# Patient Record
Sex: FEMALE | Race: WHITE | NOT HISPANIC OR LATINO | Employment: OTHER | URBAN - METROPOLITAN AREA
[De-identification: names, ages, dates, MRNs, and addresses within clinical notes are randomized per-mention and may not be internally consistent; named-entity substitution may affect disease eponyms.]

---

## 2017-01-22 DIAGNOSIS — E78.5 HYPERLIPIDEMIA: ICD-10-CM

## 2017-01-23 RX ORDER — SIMVASTATIN 20 MG
TABLET ORAL
Qty: 90 TABLET | Refills: 0 | Status: SHIPPED | OUTPATIENT
Start: 2017-01-23 | End: 2018-05-25 | Stop reason: SDUPTHER

## 2017-06-02 ENCOUNTER — OFFICE VISIT (OUTPATIENT)
Dept: OBSTETRICS AND GYNECOLOGY | Age: 82
End: 2017-06-02

## 2017-06-02 VITALS — DIASTOLIC BLOOD PRESSURE: 70 MMHG | SYSTOLIC BLOOD PRESSURE: 140 MMHG | BODY MASS INDEX: 19.6 KG/M2 | WEIGHT: 93.8 LBS

## 2017-06-02 DIAGNOSIS — N64.4 MASTALGIA IN FEMALE: ICD-10-CM

## 2017-06-02 DIAGNOSIS — Z01.419 WELL WOMAN EXAM WITH ROUTINE GYNECOLOGICAL EXAM: Primary | ICD-10-CM

## 2017-06-02 LAB
BILIRUB BLD-MCNC: NEGATIVE MG/DL
GLUCOSE UR STRIP-MCNC: NEGATIVE MG/DL
KETONES UR QL: NEGATIVE
LEUKOCYTE EST, POC: NEGATIVE
NITRITE UR-MCNC: NEGATIVE MG/ML
PH UR: 6.5 [PH] (ref 5–8)
PROT UR STRIP-MCNC: NEGATIVE MG/DL
RBC # UR STRIP: NEGATIVE /UL
SP GR UR: 1.02 (ref 1–1.03)
UROBILINOGEN UR QL: NORMAL

## 2017-06-02 PROCEDURE — 99397 PER PM REEVAL EST PAT 65+ YR: CPT | Performed by: OBSTETRICS & GYNECOLOGY

## 2017-06-02 PROCEDURE — 81003 URINALYSIS AUTO W/O SCOPE: CPT | Performed by: OBSTETRICS & GYNECOLOGY

## 2017-06-02 NOTE — PROGRESS NOTES
ANNUAL GYN EXAM        6/3/2017    Subjective     Karla Lambert is a 84 y.o., [place] White Female.    Chief Complaint   Patient presents with   • Gynecologic Exam     NEW PATIENT - FORMER PATIENT        History of Present Illness:     Gyn Complaints:  =Right Breast Sensitivity: Her right breast is been sensitive recently, for about the last 3 months, although she is a little vague about it.  It is just uncomfortable to wear her bra.  It is tender for anything to touch the breast.  She does regular self breast exams and had her mammogram today.  She has no nipple discharge.  She cannot remember any recent injury to the breast.                                  =Lower Abdominal Swelling.  She has noticed some swelling in her lower abdomen for about the last 3 months.  It is not painful.  Her appetite is okay and she states her weight is stable although she is very thin, to the point of being cachectic.  She does need a little help with her bowel movements, stool softeners and some Dulcolax.  Nevertheless, she has bowel movements daily most of the time.  She describes no blood, no mucus with her bowel movements.  There has been no change in their appearance.        She is voiding without any difficulty but she does have some general incontinence.  She has no dysuria.  She wears a pad all the time in addition to tissue paper on top of the pad.  She changes the tissue paper about 3-4 times a day.  She doesn't really notice stress incontinence but does have some urgency incontinence.                                     During her examination she pointed to the area of swelling as being the mons pubis !!  I think she is just feeling her pubic bones which are rather prominent when she is flat on her back because of her low weight.    1.Menstrual Hx:  Post Menopausal.   Her  Luis Eduardo passed in 1988, She was   12 years 2 months and 14 days     2.Pap Smear Hx: She has never had an abnormal Pap smear.  Mother  had cervical cancer dx @ 80 and  in   at age 81.    3.Breast / Ovarian Hx:   Regular SBE.       Family history of breast cancer:  Great Niece     Family history of ovarian cancer: None    4.Family Hx of Colon Ca: None       Family Hx of Uterine Ca: Mother, had cervical cancer at the age of 80,  at 81 years of age in .    5. New Past Medical History: Valve Replacement, Breast Biopsy   Past Medical History:   Diagnosis Date   • Anxiety 2016   • Aortic valve stenosis 2013   • Benign essential hypertension 2013   • Bicuspid aortic valve 2013   • Chronic obstructive pulmonary disease 2016   • Hyperlipidemia 2016   • Lung cancer         6. New Past Surgical History:   Past Surgical History:   Procedure Laterality Date   • AORTIC VALVE REPAIR/REPLACEMENT      History of bicuspid valve .  DR. RAMIRES, valve replacement with PIG valve.    • COLONOSCOPY      x1  does not remember date    • FOOT SURGERY     • LUNG BIOPSY      Lung biopsy by Dr. Church= lung cancer.  Treated with irradiation, no chemotherapy.        7. New Family Medical History:   Family History   Problem Relation Age of Onset   • Cervical cancer Mother 80      in , at age 81.   • No Known Problems Father    • No Known Problems Sister    • No Known Problems Brother    • No Known Problems Daughter    • No Known Problems Son    • No Known Problems Maternal Grandmother    • No Known Problems Paternal Grandmother    • No Known Problems Maternal Aunt    • No Known Problems Paternal Aunt    • Breast cancer Other    • BRCA 1/2 Neg Hx    • Colon cancer Neg Hx    • Endometrial cancer Neg Hx    • Ovarian cancer Neg Hx          8.   OB History    Para Term  AB SAB TAB Ectopic Multiple Living   0 0 0 0 0 0 0 0 0 0              9.   Health Maintenance   Topic Date Due   • INFLUENZA VACCINE  2017   • PNEUMOCOCCAL VACCINES (65+ LOW/MEDIUM RISK) (2 of 2 - PPSV23) 2017   • LIPID PANEL   08/19/2017   • TDAP/TD VACCINES (2 - Td) 09/13/2026   • ZOSTER VACCINE  Completed       The following portions of the patient's history were reviewed / updated as appropriate: allergies, current medications, past medical history, past surgical history, past family history, past social history, and problem list.    Review of Systems   HENT:        Mouth sores.   Eyes: Negative.    Respiratory: Negative.    Cardiovascular: Negative.    Gastrointestinal: Positive for constipation.   Endocrine: Negative.    Genitourinary: Negative.    Skin:        Hair loss  Right breast tenderness.   Allergic/Immunologic:        Seasonal allergies, itching.   Hematological: Bruises/bleeds easily.       Objective     /70  Wt 93 lb 12.8 oz (42.5 kg)  BMI 19.6 kg/m2    PHYSICAL EXAM    Constitutional: Well-developed, cachectic, frail appearing white female, in no distress, alert and well oriented ×3.    Skin is warm, dry, without rash.  She has multiple seborrheic keratoses especially on her trunk.       Neck appears normal, trachea in the midline.  Thyroid exam normal.  No carotid bruits.         No cervical lymphadenopathy.    Lungs clear to auscultation.  No bruits are heard down the left posterior chest.  She has marked scoliosis and moderate kyphosis.  The spinal rotation is such that she has marked prominence of her left posterior ribs.    Heart with regular rhythm with a faint 1/6 systolic ejection murmur, best heard in the right upper sternal border.    Breasts: The right breast exam is normal.        Right breast nontender, without dominant mass.  No nipple discharge.            No superficial skin changes.  No axillary adenopathy.        Left breast nontender, without dominant mass.  No nipple discharge.            No superficial skin changes.  No axillary adenopathy.      Abdomen is flat, soft and nontender.No palpable mass.           No hepatosplenomegaly.  Flanks are negative.          Bowel sounds normal.           "She has a faint bruit down the mid abdomen that extends along both common iliac arteries.  It is significantly louder over the right common iliac/femoral artery.         I do not feel an aneurysm along the aorta.         No inguinal lymphadenopathy bilaterally.          When asked to point out the area of swelling, she points to the pubic symphysis/mons pubis, which is not abnormally enlarged nor tender.  It feels normal.           But it is prominent because her abdomen is scaphoid and she is markedly thin.  I reassured her that this is a normal finding, and she seems reassured.    Pelvic exam :  Vulva normal.           External genitalia normal.  BUS negative.             Introitus atrophic and narrow.  Vaginal mucosa atrophic but normal.            Cervix normal, Pap smear taken because of patient's complaint of abdominal swelling.          I can only do a single finger exam, no cervical motion tenderness.          Uterus midplane and very small, normal shape, and position.          Adnexa are negative bilaterally.  No tenderness.  No palpable mass.          I am unable to perform a rectovaginal exam because of anal stenosis, it is a very tight anus.    Musc /Skel: Lower extremities without edema.  But she has multiple superficial varicosities and varicose veins.    Neuro: Coordination is grossly normal.  Gait is normal.  She never Gibbons by herself well but is obviously frail.    Psych: Mood and affect is normal.  Behavior normal.  Thought content normal.            Judgment normal.        Assessment/Plan   Karla was seen today for gynecologic exam.    Diagnoses and all orders for this visit:    Well woman exam with routine gynecological exam  -     POC Urinalysis Dipstick, Automated    Mastalgia in female  Comments:  Right breast.  Normal physical exam.  Mammogram today, await results.  Reassured patient about normal physical exam.      COMMENTS: I reassured Karla about her \"lower abdominal swelling\" which is " just her prominent mons pubis and pubic symphysis because of her marked weight loss.    Moiz Padilla MD

## 2017-06-03 PROBLEM — N64.4 MASTALGIA IN FEMALE: Status: ACTIVE | Noted: 2017-06-03

## 2017-10-27 ENCOUNTER — TELEPHONE (OUTPATIENT)
Dept: INTERNAL MEDICINE | Age: 82
End: 2017-10-27

## 2017-10-27 DIAGNOSIS — E78.5 HYPERLIPIDEMIA: ICD-10-CM

## 2017-10-27 RX ORDER — SIMVASTATIN 20 MG
TABLET ORAL
Qty: 90 TABLET | Refills: 1 | Status: SHIPPED | OUTPATIENT
Start: 2017-10-27 | End: 2018-04-18 | Stop reason: SDUPTHER

## 2017-10-27 NOTE — TELEPHONE ENCOUNTER
Called and said that she was out of her Simvastatin 20 MG, 12 90 tabs 1 time a day, her next apt is scheduled for May 25, 2018.  She said that she would like this to go to [facility].    [phone]

## 2017-11-01 ENCOUNTER — FLU SHOT (OUTPATIENT)
Dept: INTERNAL MEDICINE | Age: 82
End: 2017-11-01

## 2017-11-01 PROCEDURE — 90662 IIV NO PRSV INCREASED AG IM: CPT | Performed by: INTERNAL MEDICINE

## 2017-11-01 PROCEDURE — G0008 ADMIN INFLUENZA VIRUS VAC: HCPCS | Performed by: INTERNAL MEDICINE

## 2017-12-27 ENCOUNTER — HOSPITAL ENCOUNTER (OUTPATIENT)
Dept: CT IMAGING | Facility: HOSPITAL | Age: 82
Discharge: HOME | End: 2017-12-27
Attending: THORACIC SURGERY (CARDIOTHORACIC VASCULAR SURGERY) | Admitting: THORACIC SURGERY (CARDIOTHORACIC VASCULAR SURGERY)

## 2017-12-27 DIAGNOSIS — C34.10 MALIGNANT NEOPLASM OF BRONCHUS OF UPPER LOBE, UNSPECIFIED LATERALITY (HCC): ICD-10-CM

## 2017-12-27 PROCEDURE — 71250 CT THORAX DX C-: CPT

## 2018-01-09 ENCOUNTER — OFFICE VISIT (OUTPATIENT)
Dept: SURGERY | Facility: CLINIC | Age: 83
End: 2018-01-09

## 2018-01-09 VITALS
SYSTOLIC BLOOD PRESSURE: 117 MMHG | OXYGEN SATURATION: 95 % | WEIGHT: 94 LBS | HEIGHT: 58 IN | DIASTOLIC BLOOD PRESSURE: 61 MMHG | BODY MASS INDEX: 19.73 KG/M2 | HEART RATE: 69 BPM

## 2018-01-09 DIAGNOSIS — C34.12 MALIGNANT NEOPLASM OF BRONCHUS OF LEFT UPPER LOBE (HCC): ICD-10-CM

## 2018-01-09 DIAGNOSIS — R91.1 LUNG NODULE: Primary | ICD-10-CM

## 2018-01-09 PROCEDURE — 99213 OFFICE O/P EST LOW 20 MIN: CPT | Performed by: THORACIC SURGERY (CARDIOTHORACIC VASCULAR SURGERY)

## 2018-01-09 NOTE — PROGRESS NOTES
Subjective   Patient ID: Karla Lambert is a 85 y.o. female is here today for follow-up.    History of Present Illness  Dear Colleague,  Karla Lambert was seen in our office today for other follow-up of a stage I adenocarcinoma of the lung treated with stereotactic radiation therapy in March 2015.  Since her last visit here she has done quite well. She reports no cough and no hemoptysis.  She has no hoarseness or change in her voice.  She has had no pleuritic pain.  She is relatively active with no significant shortness of breath or wheezing.  She has had no unexplained weight loss.    The following portions of the patient's history were reviewed and updated as appropriate: allergies, current medications, past family history, past medical history, past social history, past surgical history and problem list.  Review of Systems   Constitution: Negative.   HENT: Negative.    Eyes: Negative.    Cardiovascular: Negative.    Respiratory: Negative.    Endocrine: Negative.    Hematologic/Lymphatic: Negative.    Skin: Negative.    Musculoskeletal: Negative.    Gastrointestinal: Negative.    Genitourinary: Negative.    Neurological: Negative.    Psychiatric/Behavioral: Negative.      Patient Active Problem List   Diagnosis   • Anxiety   • Chronic obstructive pulmonary disease   • Hyperlipidemia   • Malignant neoplasm of bronchus of upper lobe   • Abnormal loss of weight   • Skin tear of elbow without complication   • Bicuspid aortic valve   • History of aortic valve replacement   • Routine health maintenance   • Weight loss   • Mastalgia in female   • Lung nodule     Past Medical History:   Diagnosis Date   • Anxiety 6/14/2016   • Aortic valve stenosis 8/9/2013   • Benign essential hypertension 8/9/2013   • Bicuspid aortic valve 8/9/2013   • Chronic obstructive pulmonary disease 6/14/2016   • Hyperlipidemia 6/14/2016   • Lung cancer 2005     Past Surgical History:   Procedure Laterality Date   • AORTIC VALVE  REPAIR/REPLACEMENT      History of bicuspid valve .  DR. RAMIRES, valve replacement with PIG valve.    • COLONOSCOPY      x1  does not remember date    • FOOT SURGERY     • LUNG BIOPSY      Lung biopsy by Dr. Church= lung cancer.  Treated with irradiation, no chemotherapy.     Family History   Problem Relation Age of Onset   • Cervical cancer Mother 80      in , at age 81.   • No Known Problems Father    • No Known Problems Sister    • No Known Problems Brother    • No Known Problems Daughter    • No Known Problems Son    • No Known Problems Maternal Grandmother    • No Known Problems Paternal Grandmother    • No Known Problems Maternal Aunt    • No Known Problems Paternal Aunt    • Breast cancer Other    • BRCA 1/2 Neg Hx    • Colon cancer Neg Hx    • Endometrial cancer Neg Hx    • Ovarian cancer Neg Hx      Social History     Social History   • Marital status:      Spouse name: GISELE   • Number of children: 0   • Years of education: N/A     Occupational History   • Not on file.     Social History Main Topics   • Smoking status: Former Smoker     Types: Cigarettes   • Smokeless tobacco: Never Used      Comment: Now has COPD and lung cancer.   • Alcohol use No   • Drug use: No   • Sexual activity: Defer      Comment:       Other Topics Concern   • Not on file     Social History Narrative       Current Outpatient Prescriptions:   •  aspirin (ASPIRIN LOW DOSE) 81 MG tablet, Take  by mouth daily., Disp: , Rfl:   •  Calcium Carbonate-Vitamin D (CALCIUM 500 + D) 500-125 MG-UNIT tablet, Take  by mouth daily., Disp: , Rfl:   •  cyanocobalamin 100 MCG tablet, Take  by mouth daily., Disp: , Rfl:   •  Multiple Vitamins-Minerals ([employer] SPECTRAVITE ULTRA WOMEN) tablet, Take 1 capsule by mouth., Disp: , Rfl:   •  Omega-3 Fatty Acids (FISH OIL) 1200 MG capsule capsule, Take  by mouth., Disp: , Rfl:   •  polyethyl glycol-propyl glycol (SYSTANE) 0.4-0.3 % solution ophthalmic solution, Apply  to eye.,  Disp: , Rfl:   •  Potassium Gluconate 550 MG tablet, Take 1 tablet by mouth daily., Disp: , Rfl:   •  pyridoxine (VITAMIN B-6) 100 MG tablet, Take  by mouth., Disp: , Rfl:   •  simvastatin (ZOCOR) 20 MG tablet, TAKE 1 TABLET BY MOUTH EVERY DAY EVERY NIGHT, Disp: 90 tablet, Rfl: 0  •  simvastatin (ZOCOR) 20 MG tablet, TAKE 1 TABLET BY MOUTH EVERY NIGHT., Disp: 90 tablet, Rfl: 1  Allergies   Allergen Reactions   • Morphine Sulfate    • Penicillins         Objective   Vitals:    01/09/18 0852   BP: 117/61   Pulse: 69   SpO2: 95%     Physical Exam   Constitutional: She is oriented to person, place, and time. She appears well-developed and well-nourished.   HENT:   Head: Normocephalic.   Eyes: Conjunctivae, EOM and lids are normal. Pupils are equal, round, and reactive to light.   Neck: Trachea normal and normal range of motion. Neck supple. No hepatojugular reflux and no JVD present. Carotid bruit is not present. No thyroid mass and no thyromegaly present.   Cardiovascular: Normal rate, regular rhythm, S1 normal, S2 normal and normal pulses.   No extrasystoles are present. PMI is not displaced.    Murmur heard.   Systolic murmur is present with a grade of 2/6   Pulmonary/Chest: Effort normal and breath sounds normal.   Abdominal: Soft. Normal appearance and bowel sounds are normal. She exhibits no mass. There is no hepatosplenomegaly. There is no tenderness. No hernia.   Musculoskeletal: Normal range of motion.   Neurological: She is alert and oriented to person, place, and time. She has normal strength and normal reflexes. No cranial nerve deficit or sensory deficit. She displays a negative Romberg sign.   Skin: Skin is warm, dry and intact.   Psychiatric: She has a normal mood and affect. Her speech is normal and behavior is normal. Judgment and thought content normal. Cognition and memory are normal.     Independent Review of Radiographic Studies:    CT scan of the chest performed December 27, 2017 was independently  reviewed and compared to previous x-rays. There is an increasing soft tissue density medial to the site of the previously treated neoplasm with stereotactic radiation. This nodule measures 11 x 9 mm. There are no other infiltrates nodules or masses.  There is no pathologically enlarged hilar or mediastinal adenopathy.  Her abdomen is unremarkable.          Assessment/Plan       This new nodule is suspicious for a recurrent lung cancer.  I have ordered a CT PET scan to evaluate this further.  Should this be PET positive she will then need a needle biopsy.  I've discussed this with the patient and her family in detail.  They understand my plan and agree.  Once the PET scan is completed she will return here for further evaluation.  I will keep you informed of her progress.    Diagnoses and all orders for this visit:    Lung nodule    Malignant neoplasm of bronchus of left upper lobe

## 2018-01-11 DIAGNOSIS — Z85.118 HISTORY OF LUNG CANCER: ICD-10-CM

## 2018-01-11 DIAGNOSIS — Z85.118 HISTORY OF CANCER OF UPPER LOBE BRONCHUS OR LUNG: ICD-10-CM

## 2018-01-11 DIAGNOSIS — R91.8 LUNG MASS: Primary | ICD-10-CM

## 2018-01-11 DIAGNOSIS — R91.1 LUNG NODULE: Primary | ICD-10-CM

## 2018-01-12 ENCOUNTER — HOSPITAL ENCOUNTER (OUTPATIENT)
Dept: PET IMAGING | Facility: HOSPITAL | Age: 83
Discharge: HOME | End: 2018-01-12

## 2018-01-12 ENCOUNTER — HOSPITAL ENCOUNTER (OUTPATIENT)
Dept: PET IMAGING | Facility: HOSPITAL | Age: 83
Discharge: HOME | End: 2018-01-12
Attending: THORACIC SURGERY (CARDIOTHORACIC VASCULAR SURGERY)

## 2018-01-12 ENCOUNTER — HOSPITAL ENCOUNTER (OUTPATIENT)
Dept: PET IMAGING | Facility: HOSPITAL | Age: 83
Discharge: HOME | End: 2018-01-12
Attending: THORACIC SURGERY (CARDIOTHORACIC VASCULAR SURGERY) | Admitting: THORACIC SURGERY (CARDIOTHORACIC VASCULAR SURGERY)

## 2018-01-12 DIAGNOSIS — Z85.118 HISTORY OF LUNG CANCER: ICD-10-CM

## 2018-01-12 DIAGNOSIS — R91.1 LUNG NODULE: ICD-10-CM

## 2018-01-12 DIAGNOSIS — R91.8 LUNG MASS: ICD-10-CM

## 2018-01-12 DIAGNOSIS — Z85.118 HISTORY OF CANCER OF UPPER LOBE BRONCHUS OR LUNG: ICD-10-CM

## 2018-01-12 LAB — GLUCOSE BLDC GLUCOMTR-MCNC: 90 MG/DL (ref 70–130)

## 2018-01-12 PROCEDURE — 78815 PET IMAGE W/CT SKULL-THIGH: CPT

## 2018-01-12 PROCEDURE — 0 FLUDEOXYGLUCOSE F18 SOLUTION: Performed by: THORACIC SURGERY (CARDIOTHORACIC VASCULAR SURGERY)

## 2018-01-12 PROCEDURE — A9552 F18 FDG: HCPCS | Performed by: THORACIC SURGERY (CARDIOTHORACIC VASCULAR SURGERY)

## 2018-01-12 PROCEDURE — 82962 GLUCOSE BLOOD TEST: CPT

## 2018-01-12 RX ADMIN — FLUDEOXYGLUCOSE F18 1 DOSE: 300 INJECTION INTRAVENOUS at 10:00

## 2018-01-15 ENCOUNTER — TELEPHONE (OUTPATIENT)
Dept: SURGERY | Facility: CLINIC | Age: 83
End: 2018-01-15

## 2018-01-15 DIAGNOSIS — C34.11 MALIGNANT NEOPLASM OF UPPER LOBE OF RIGHT LUNG (HCC): Primary | ICD-10-CM

## 2018-01-15 NOTE — TELEPHONE ENCOUNTER
I called Mrs. Lambert today and discussed the results of the CT PET scan.  The area in the apex of the lung  looks like radiation changes, no PET evidence of cancer.  I have recommended a repeat CT of the chest in 4 months.  She'll return to see me in the office with that CT scan.

## 2018-04-18 DIAGNOSIS — E78.5 HYPERLIPIDEMIA: ICD-10-CM

## 2018-04-18 RX ORDER — SIMVASTATIN 20 MG
TABLET ORAL
Qty: 90 TABLET | Refills: 1 | Status: SHIPPED | OUTPATIENT
Start: 2018-04-18 | End: 2018-11-12

## 2018-05-15 ENCOUNTER — APPOINTMENT (OUTPATIENT)
Dept: CT IMAGING | Facility: HOSPITAL | Age: 83
End: 2018-05-15
Attending: THORACIC SURGERY (CARDIOTHORACIC VASCULAR SURGERY)

## 2018-05-25 ENCOUNTER — HOSPITAL ENCOUNTER (OUTPATIENT)
Dept: CT IMAGING | Facility: HOSPITAL | Age: 83
Discharge: HOME | End: 2018-05-25

## 2018-05-25 ENCOUNTER — HOSPITAL ENCOUNTER (OUTPATIENT)
Dept: CT IMAGING | Facility: HOSPITAL | Age: 83
Discharge: HOME | End: 2018-05-25
Attending: THORACIC SURGERY (CARDIOTHORACIC VASCULAR SURGERY) | Admitting: THORACIC SURGERY (CARDIOTHORACIC VASCULAR SURGERY)

## 2018-05-25 ENCOUNTER — OFFICE VISIT (OUTPATIENT)
Dept: INTERNAL MEDICINE | Age: 83
End: 2018-05-25

## 2018-05-25 VITALS
TEMPERATURE: 98.1 F | HEART RATE: 74 BPM | OXYGEN SATURATION: 98 % | SYSTOLIC BLOOD PRESSURE: 136 MMHG | HEIGHT: 57 IN | DIASTOLIC BLOOD PRESSURE: 64 MMHG | WEIGHT: 92.2 LBS | BODY MASS INDEX: 19.89 KG/M2

## 2018-05-25 DIAGNOSIS — Z00.00 MEDICARE ANNUAL WELLNESS VISIT, SUBSEQUENT: Primary | ICD-10-CM

## 2018-05-25 DIAGNOSIS — R41.3 MEMORY CHANGE: ICD-10-CM

## 2018-05-25 DIAGNOSIS — R19.8 ABDOMINAL FULLNESS IN RIGHT UPPER QUADRANT: ICD-10-CM

## 2018-05-25 DIAGNOSIS — E78.5 HYPERLIPIDEMIA, UNSPECIFIED HYPERLIPIDEMIA TYPE: ICD-10-CM

## 2018-05-25 DIAGNOSIS — C34.11 MALIGNANT NEOPLASM OF UPPER LOBE OF RIGHT LUNG (HCC): ICD-10-CM

## 2018-05-25 DIAGNOSIS — Z23 ENCOUNTER FOR IMMUNIZATION: ICD-10-CM

## 2018-05-25 DIAGNOSIS — F17.200 CURRENT SMOKER: ICD-10-CM

## 2018-05-25 LAB — CREAT BLDA-MCNC: 0.8 MG/DL (ref 0.6–1.3)

## 2018-05-25 PROCEDURE — G0009 ADMIN PNEUMOCOCCAL VACCINE: HCPCS | Performed by: INTERNAL MEDICINE

## 2018-05-25 PROCEDURE — 71250 CT THORAX DX C-: CPT

## 2018-05-25 PROCEDURE — 99214 OFFICE O/P EST MOD 30 MIN: CPT | Performed by: INTERNAL MEDICINE

## 2018-05-25 PROCEDURE — G0439 PPPS, SUBSEQ VISIT: HCPCS | Performed by: INTERNAL MEDICINE

## 2018-05-25 PROCEDURE — 90732 PPSV23 VACC 2 YRS+ SUBQ/IM: CPT | Performed by: INTERNAL MEDICINE

## 2018-05-25 PROCEDURE — 25010000002 IOPAMIDOL 61 % SOLUTION: Performed by: INTERNAL MEDICINE

## 2018-05-25 PROCEDURE — 82565 ASSAY OF CREATININE: CPT

## 2018-05-25 PROCEDURE — 74160 CT ABDOMEN W/CONTRAST: CPT

## 2018-05-25 RX ADMIN — IOPAMIDOL 85 ML: 612 INJECTION, SOLUTION INTRAVENOUS at 12:52

## 2018-05-25 NOTE — PATIENT INSTRUCTIONS
Medicare Wellness  Personal Prevention Plan of Service     Date of Office Visit:  2018  Encounter Provider:  Moiz Longo MD  Place of Service:  [facility]  Patient Name: Karla Lambert  :  1932    As part of the Medicare Wellness portion of your visit today, we are providing you with this personalized preventive plan of services (PPPS). This plan is based upon recommendations of the [place] Preventive Services Task Force (USPSTF) and the Advisory Committee on Immunization Practices (ACIP).    This lists the preventive care services that should be considered, and provides dates of when you are due. Items listed as completed are up-to-date and do not require any further intervention.    Health Maintenance   Topic Date Due   • ZOSTER VACCINE (2 of 2) 10/14/2013   • PNEUMOCOCCAL VACCINES (65+ LOW/MEDIUM RISK) (2 of 2 - PPSV23) 2017   • INFLUENZA VACCINE  2018   • MEDICARE ANNUAL WELLNESS  2019   • LIPID PANEL  2019   • TDAP/TD VACCINES (2 - Td) 2026       Orders Placed This Encounter   Procedures   • CT Abdomen With Contrast     Order Specific Question:   Will Oral Contrast be needed for this procedure?     Answer:   Yes   • Pneumococcal Polysaccharide Vaccine 23-Valent Greater Than or Equal To 1yo Subcutaneous / IM   • Comprehensive Metabolic Panel   • Lipid Panel   • TSH   • Vitamin B12   • RPR   • CBC & Differential     Order Specific Question:   Manual Differential     Answer:   No       No Follow-up on file.

## 2018-05-25 NOTE — PROGRESS NOTES
QUICK REFERENCE INFORMATION:  The ABCs of the Annual Wellness Visit    Subsequent Medicare Wellness Visit    HEALTH RISK ASSESSMENT    11/29/1932    Recent Hospitalizations:  No hospitalization(s) within the last year..        Current Medical Providers:  Patient Care Team:  Moiz Longo MD as PCP - General  Moiz Longo MD as PCP - Family Medicine  Eliot Bain III, MD as Surgeon (Thoracic Surgery)        Smoking Status:  History   Smoking Status   • Current Some Day Smoker   • Types: Cigarettes   Smokeless Tobacco   • Never Used     Comment: Now has COPD and lung cancer.       Alcohol Consumption:  History   Alcohol Use No       Depression Screen:   PHQ-2/PHQ-9 Depression Screening 5/25/2018   Little interest or pleasure in doing things 0   Feeling down, depressed, or hopeless 1   Total Score 1       Health Habits and Functional and Cognitive Screening:  Functional & Cognitive Status 5/25/2018   Do you have difficulty bathing yourself, getting dressed or grooming yourself? No   Do you have difficulty using the toilet? No   Do you have difficulty moving around from place to place? No   Do you have trouble with steps or getting out of a bed or a chair? No   In the past year have you fallen or experienced a near fall? No   Do you need help using the phone?  No   Are you deaf or do you have serious difficulty hearing?  Yes   Do you need help with transportation? No   Do you need help shopping? No   Do you need help preparing meals?  No   Do you need help with housework?  No   Do you need help with laundry? No   Do you need help taking your medications? No   Do you need help managing money? No   Do you have difficulty concentrating, remembering or making decisions? Yes           Does the patient have evidence of cognitive impairment? Yes    Aspirin use counseling: Taking ASA appropriately as indicated      Recent Lab Results:  CMP:  Lab Results   Component Value Date    BUN 7 (L) 01/17/2016    CREATININE 0.72  01/17/2016     01/17/2016    K 4.0 01/17/2016    CO2 28 01/17/2016    CALCIUM 9.9 01/17/2016    ALBUMIN 4.1 01/17/2016    BILITOT 0.2 01/17/2016    ALKPHOS 56 01/17/2016    AST 30 08/19/2016    ALT 17 08/19/2016     Lipid Panel:  Lab Results   Component Value Date    TRIG 61 08/19/2016     (H) 08/19/2016    VLDL 12.2 08/19/2016     HbA1c:       Visual Acuity:  No exam data present    Age-appropriate Screening Schedule:  Refer to the list below for future screening recommendations based on patient's age, sex and/or medical conditions. Orders for these recommended tests are listed in the plan section. The patient has been provided with a written plan.    Health Maintenance   Topic Date Due   • ZOSTER VACCINE (2 of 2) 10/14/2013   • PNEUMOCOCCAL VACCINES (65+ LOW/MEDIUM RISK) (2 of 2 - PPSV23) 08/18/2017   • INFLUENZA VACCINE  08/01/2018   • LIPID PANEL  05/25/2019   • TDAP/TD VACCINES (2 - Td) 09/13/2026        Subjective   History of Present Illness    Karla Lambert is a 85 y.o. female who presents for an Subsequent Wellness Visit.    The following portions of the patient's history were reviewed and updated as appropriate: allergies, current medications, past family history, past medical history, past social history, past surgical history and problem list.    Outpatient Medications Prior to Visit   Medication Sig Dispense Refill   • aspirin (ASPIRIN LOW DOSE) 81 MG tablet Take  by mouth daily.     • Calcium Carbonate-Vitamin D (CALCIUM 500 + D) 500-125 MG-UNIT tablet Take  by mouth daily.     • cyanocobalamin 100 MCG tablet Take  by mouth daily.     • Multiple Vitamins-Minerals ([employer] SPECTRAVITE ULTRA WOMEN) tablet Take 1 capsule by mouth.     • Omega-3 Fatty Acids (FISH OIL) 1200 MG capsule capsule Take  by mouth.     • polyethyl glycol-propyl glycol (SYSTANE) 0.4-0.3 % solution ophthalmic solution Apply  to eye.     • Potassium Gluconate 550 MG tablet Take 1 tablet by mouth daily.     • pyridoxine  "(VITAMIN B-6) 100 MG tablet Take  by mouth.     • simvastatin (ZOCOR) 20 MG tablet TAKE 1 TABLET BY MOUTH EVERY NIGHT. 90 tablet 1   • simvastatin (ZOCOR) 20 MG tablet TAKE 1 TABLET BY MOUTH EVERY DAY EVERY NIGHT 90 tablet 0     No facility-administered medications prior to visit.        Patient Active Problem List   Diagnosis   • Anxiety   • Chronic obstructive pulmonary disease   • Hyperlipidemia   • Malignant neoplasm of bronchus of upper lobe   • Abnormal loss of weight   • S/P aortic valve replacement with bioprosthetic valve   • Routine health maintenance   • Mastalgia in female   • Lung nodule       Advance Care Planning:  has an advance directive - a copy has been provided and is in file    Identification of Risk Factors:  Risk factors include: inactivity and cognitive impairment.    Review of Systems    Compared to one year ago, the patient feels her physical health is the same.  Compared to one year ago, the patient feels her mental health is the same.    Objective     Physical Exam    Vitals:    05/25/18 0955   BP: 136/64   Pulse: 74   Temp: 98.1 °F (36.7 °C)   SpO2: 98%   Weight: 41.8 kg (92 lb 3.2 oz)   Height: 143.7 cm (56.58\")   PainSc: 0-No pain       Patient's Body mass index is 20.25 kg/m². BMI is within normal parameters. No follow-up required.      Assessment/Plan   Patient Self-Management and Personalized Health Advice  The patient has been provided with information about: mental health concerns and preventive services including:   · Pneumococcal vaccine , Patient is advised to look into obtaining the new shingles vaccine, along with a hepatitis A injection..    Visit Diagnoses:    ICD-10-CM ICD-9-CM   1. Medicare annual wellness visit, subsequent Z00.00 V70.0   2. Memory change R41.3 780.93   3. Hyperlipidemia, unspecified hyperlipidemia type E78.5 272.4   4. Current smoker F17.200 305.1   5. Encounter for immunization Z23 V03.89       Orders Placed This Encounter   Procedures   • Pneumococcal " Polysaccharide Vaccine 23-Valent Greater Than or Equal To 1yo Subcutaneous / IM       Outpatient Encounter Prescriptions as of 5/25/2018   Medication Sig Dispense Refill   • aspirin (ASPIRIN LOW DOSE) 81 MG tablet Take  by mouth daily.     • Calcium Carbonate-Vitamin D (CALCIUM 500 + D) 500-125 MG-UNIT tablet Take  by mouth daily.     • cyanocobalamin 100 MCG tablet Take  by mouth daily.     • Multiple Vitamins-Minerals ([employer] SPECTRAVITE ULTRA WOMEN) tablet Take 1 capsule by mouth.     • Omega-3 Fatty Acids (FISH OIL) 1200 MG capsule capsule Take  by mouth.     • polyethyl glycol-propyl glycol (SYSTANE) 0.4-0.3 % solution ophthalmic solution Apply  to eye.     • Potassium Gluconate 550 MG tablet Take 1 tablet by mouth daily.     • pyridoxine (VITAMIN B-6) 100 MG tablet Take  by mouth.     • simvastatin (ZOCOR) 20 MG tablet TAKE 1 TABLET BY MOUTH EVERY NIGHT. 90 tablet 1   • [DISCONTINUED] simvastatin (ZOCOR) 20 MG tablet TAKE 1 TABLET BY MOUTH EVERY DAY EVERY NIGHT 90 tablet 0     No facility-administered encounter medications on file as of 5/25/2018.        Reviewed use of high risk medication in the elderly: yes  Reviewed for potential of harmful drug interactions in the elderly: yes    Follow Up:  1 year    An After Visit Summary and PPPS with all of these plans were given to the patient.

## 2018-05-25 NOTE — PROGRESS NOTES
Subjective   Karla Lambert is a 85 y.o. female.     History of Present Illness     Patient presents today for subsequent annual Medicare wellness evaluation.    Health maintenance: Last ophthalmology visit may be greater than 2 years ago.  Patient was advised to see the ophthalmologist within the remainder this calendar year.  Dentist appointment to be arranged.  Patient has difficulty with transportation and must depend upon others to make appointments.  Exercise: Housework only.  She does walk around her block daily.    Memory issues, the mini cog exam was abnormal,    Hyperlipidemia: Patient's compliant with medication (Zocor), and she is fasting for lab work today.    Current occasional smoker.  Patient has history of COPD and is also had a lung cancer and continues to smoke on occasional basis.  We did have a discussion that this is not in her best interest, that smoking can precipitate other primary malignancies as well as contribute to a recurrence of her original lung cancer diagnosis.    Immunization update, Pneumovax will be provided today, we did discuss the utility of shingles vaccine along with hepatitis A immunization.        The following portions of the patient's history were reviewed and updated as appropriate: allergies, current medications, past family history, past medical history, past social history, past surgical history and problem list.    Review of Systems   Constitutional: Negative.    HENT: Negative.    Eyes: Negative.    Respiratory: Positive for cough.         With environmental exposure.   Cardiovascular: Negative.    Gastrointestinal: Negative.    Endocrine: Negative.    Genitourinary: Negative.    Musculoskeletal: Negative.    Skin: Negative.    Allergic/Immunologic: Negative for immunocompromised state.   Neurological: Negative.    Hematological: Bruises/bleeds easily.        Patient is currently on aspirin 81 mg per day.   Psychiatric/Behavioral: Negative.        Objective    Physical Exam   Constitutional: She is oriented to person, place, and time. She appears well-developed. No distress.   thin   HENT:   Head: Normocephalic and atraumatic.   Right Ear: Tympanic membrane, external ear and ear canal normal.   Left Ear: Tympanic membrane, external ear and ear canal normal.   Mouth/Throat: Uvula is midline, oropharynx is clear and moist and mucous membranes are normal.   Eyes: Conjunctivae and EOM are normal. Pupils are equal, round, and reactive to light.   Neck: Normal range of motion. Neck supple. No JVD present. Carotid bruit is not present. No thyromegaly present.   Cardiovascular: Normal rate, regular rhythm, normal heart sounds and intact distal pulses.  Exam reveals no gallop and no friction rub.    No murmur heard.  Pulmonary/Chest: Effort normal and breath sounds normal. She has no wheezes. She has no rales.   Abdominal: Bowel sounds are normal. There is no hepatosplenomegaly. There is no tenderness.   Fullness noted right upper quadrant.   Genitourinary:   Genitourinary Comments: Defer    Musculoskeletal: Normal range of motion. She exhibits no edema or deformity.   Lymphadenopathy:     She has no cervical adenopathy.   Neurological: She is alert and oriented to person, place, and time. She has normal strength and normal reflexes. No cranial nerve deficit or sensory deficit. Coordination normal.   Skin: Skin is warm and dry. No rash noted.   Psychiatric: She has a normal mood and affect. Her speech is normal and behavior is normal. Judgment and thought content normal. Cognition and memory are normal.   Nursing note and vitals reviewed.      Assessment/Plan   Karla was seen today for annual exam.    Diagnoses and all orders for this visit:    Medicare annual wellness visit, subsequent    Memory change  -     TSH  -     Vitamin B12  -     RPR    Hyperlipidemia, unspecified hyperlipidemia type  -     Comprehensive Metabolic Panel  -     Lipid Panel    Current smoker    Encounter  for immunization  -     Pneumococcal Polysaccharide Vaccine 23-Valent Greater Than or Equal To 1yo Subcutaneous / IM    Abdominal fullness in right upper quadrant  -     CT Abdomen With Contrast  -     CBC & Differential      Number-one subsequent annual Medicare wellness evaluation, see comments above and below.    #2 patient difficulty with the mini cog today, we'll check laboratory as above, and follow-up with me in one month for more detailed mental status exam.  If that is abnormal, will check CT brain.    #3 hyperlipidemia on medication, status to be determined.  Plan: Continue meds, check lab as above.    #4 current smoker.  Patient has COPD and bronchogenic carcinoma.  We did discuss the risk that she is putting herself out of a second potentially fatal malignancy because of the continued smoking.  Patient has promised me that she will quit smoking by the time that I see her next.      #5 immunization update, we will provide her with a Pneumovax today.    #6 abnormal fullness noted in the right upper quadrant on palpation, will check CT scan of the abdomen with contrast.  Follow-up results by mail or by phone as appropriate.

## 2018-05-27 LAB
ALBUMIN SERPL-MCNC: 4.2 G/DL (ref 3.5–5.2)
ALBUMIN/GLOB SERPL: 1.4 G/DL
ALP SERPL-CCNC: 63 U/L (ref 39–117)
ALT SERPL-CCNC: 13 U/L (ref 1–33)
AST SERPL-CCNC: 21 U/L (ref 1–32)
BASOPHILS # BLD AUTO: 0.06 10*3/MM3 (ref 0–0.2)
BASOPHILS NFR BLD AUTO: 1.1 % (ref 0–1.5)
BILIRUB SERPL-MCNC: 0.3 MG/DL (ref 0.1–1.2)
BUN SERPL-MCNC: 11 MG/DL (ref 8–23)
BUN/CREAT SERPL: 14.9 (ref 7–25)
CALCIUM SERPL-MCNC: 9.1 MG/DL (ref 8.6–10.5)
CHLORIDE SERPL-SCNC: 102 MMOL/L (ref 98–107)
CHOLEST SERPL-MCNC: 216 MG/DL (ref 0–200)
CO2 SERPL-SCNC: 23.6 MMOL/L (ref 22–29)
CREAT SERPL-MCNC: 0.74 MG/DL (ref 0.57–1)
EOSINOPHIL # BLD AUTO: 0.04 10*3/MM3 (ref 0–0.7)
EOSINOPHIL NFR BLD AUTO: 0.7 % (ref 0.3–6.2)
ERYTHROCYTE [DISTWIDTH] IN BLOOD BY AUTOMATED COUNT: 13.6 % (ref 11.7–13)
GFR SERPLBLD CREATININE-BSD FMLA CKD-EPI: 75 ML/MIN/1.73
GFR SERPLBLD CREATININE-BSD FMLA CKD-EPI: 90 ML/MIN/1.73
GLOBULIN SER CALC-MCNC: 2.9 GM/DL
GLUCOSE SERPL-MCNC: 101 MG/DL (ref 65–99)
HCT VFR BLD AUTO: 37.8 % (ref 35.6–45.5)
HDLC SERPL-MCNC: 90 MG/DL (ref 40–60)
HGB BLD-MCNC: 12 G/DL (ref 11.9–15.5)
IMM GRANULOCYTES # BLD: 0 10*3/MM3 (ref 0–0.03)
IMM GRANULOCYTES NFR BLD: 0 % (ref 0–0.5)
LDLC SERPL CALC-MCNC: 110 MG/DL (ref 0–100)
LYMPHOCYTES # BLD AUTO: 1.18 10*3/MM3 (ref 0.9–4.8)
LYMPHOCYTES NFR BLD AUTO: 21 % (ref 19.6–45.3)
MCH RBC QN AUTO: 31 PG (ref 26.9–32)
MCHC RBC AUTO-ENTMCNC: 31.7 G/DL (ref 32.4–36.3)
MCV RBC AUTO: 97.7 FL (ref 80.5–98.2)
MONOCYTES # BLD AUTO: 0.66 10*3/MM3 (ref 0.2–1.2)
MONOCYTES NFR BLD AUTO: 11.7 % (ref 5–12)
NEUTROPHILS # BLD AUTO: 3.68 10*3/MM3 (ref 1.9–8.1)
NEUTROPHILS NFR BLD AUTO: 65.5 % (ref 42.7–76)
PLATELET # BLD AUTO: 258 10*3/MM3 (ref 140–500)
POTASSIUM SERPL-SCNC: 4.3 MMOL/L (ref 3.5–5.2)
PROT SERPL-MCNC: 7.1 G/DL (ref 6–8.5)
RBC # BLD AUTO: 3.87 10*6/MM3 (ref 3.9–5.2)
RPR SER QL: NORMAL
SODIUM SERPL-SCNC: 141 MMOL/L (ref 136–145)
TRIGL SERPL-MCNC: 79 MG/DL (ref 0–150)
TSH SERPL DL<=0.005 MIU/L-ACNC: 0.63 MIU/ML (ref 0.27–4.2)
VIT B12 SERPL-MCNC: 752 PG/ML (ref 211–946)
VLDLC SERPL CALC-MCNC: 15.8 MG/DL (ref 5–40)
WBC # BLD AUTO: 5.62 10*3/MM3 (ref 4.5–10.7)

## 2018-06-01 ENCOUNTER — TELEPHONE (OUTPATIENT)
Dept: INTERNAL MEDICINE | Age: 83
End: 2018-06-01

## 2018-06-01 NOTE — TELEPHONE ENCOUNTER
Pt's PAAnila, called asking if pt's CT of abdomen was in? Results are in pt's chart.  Anila's # [phone]  Thanks SP

## 2018-07-03 DIAGNOSIS — R91.1 LUNG NODULE: Primary | ICD-10-CM

## 2018-07-06 DIAGNOSIS — R91.1 LUNG NODULE: Primary | ICD-10-CM

## 2018-07-13 ENCOUNTER — OFFICE VISIT (OUTPATIENT)
Dept: INTERNAL MEDICINE | Age: 83
End: 2018-07-13

## 2018-07-13 VITALS
OXYGEN SATURATION: 98 % | DIASTOLIC BLOOD PRESSURE: 56 MMHG | WEIGHT: 99.3 LBS | BODY MASS INDEX: 21.42 KG/M2 | SYSTOLIC BLOOD PRESSURE: 112 MMHG | TEMPERATURE: 98.7 F | HEART RATE: 82 BPM | HEIGHT: 57 IN

## 2018-07-13 DIAGNOSIS — G31.84 MCI (MILD COGNITIVE IMPAIRMENT): Primary | ICD-10-CM

## 2018-07-13 PROCEDURE — 99213 OFFICE O/P EST LOW 20 MIN: CPT | Performed by: INTERNAL MEDICINE
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
## 2018-07-16 LAB
ALBUMIN SERPL ELPH-MCNC: 3.8 G/DL (ref 2.9–4.4)
ALBUMIN/GLOB SERPL: 1.1 {RATIO} (ref 0.7–1.7)
ALPHA1 GLOB SERPL ELPH-MCNC: 0.3 G/DL (ref 0–0.4)
ALPHA2 GLOB SERPL ELPH-MCNC: 0.7 G/DL (ref 0.4–1)
B-GLOBULIN SERPL ELPH-MCNC: 1.1 G/DL (ref 0.7–1.3)
GAMMA GLOB SERPL ELPH-MCNC: 1.3 G/DL (ref 0.4–1.8)
GLOBULIN SER CALC-MCNC: 3.4 G/DL (ref 2.2–3.9)
LABORATORY COMMENT REPORT: NORMAL
M PROTEIN SERPL ELPH-MCNC: NORMAL G/DL
PROT SERPL-MCNC: 7.2 G/DL (ref 6–8.5)
RPR SER QL: NORMAL
TSH SERPL DL<=0.005 MIU/L-ACNC: 0.7 MIU/ML (ref 0.27–4.2)
VIT B12 SERPL-MCNC: 628 PG/ML (ref 211–946)

## 2018-07-27 ENCOUNTER — HOSPITAL ENCOUNTER (OUTPATIENT)
Dept: CT IMAGING | Facility: HOSPITAL | Age: 83
Discharge: HOME | End: 2018-07-27
Admitting: INTERNAL MEDICINE

## 2018-07-27 PROCEDURE — 70450 CT HEAD/BRAIN W/O DYE: CPT

## 2018-07-30 ENCOUNTER — TELEPHONE (OUTPATIENT)
Dept: INTERNAL MEDICINE | Age: 83
End: 2018-07-30

## 2018-07-30 NOTE — TELEPHONE ENCOUNTER
Left voicemail requesting pt to return call when possible to discuss results .    ----- Message from Benjy Holt MA sent at 7/30/2018  8:11 AM EDT -----      ----- Message -----  From: Moiz Longo MD  Sent: 7/29/2018   7:53 AM  To: Kristin Cerrato LPN    CT shows evidence of several old strokes. Because of this, I would stop the fish oil and add a baby aspirin at 81 mg daily with food. Dr Longo

## 2018-07-30 NOTE — TELEPHONE ENCOUNTER
Read pt word for word CT results from Dr. Longo at the beginning of this message. She is going to process this and get a list of questions together. She will keep her appointment on August the 9th with Dr. Longo.

## 2018-08-09 ENCOUNTER — OFFICE VISIT (OUTPATIENT)
Dept: INTERNAL MEDICINE | Age: 83
End: 2018-08-09

## 2018-08-09 VITALS
SYSTOLIC BLOOD PRESSURE: 150 MMHG | HEIGHT: 57 IN | OXYGEN SATURATION: 97 % | HEART RATE: 72 BPM | WEIGHT: 91.8 LBS | BODY MASS INDEX: 19.8 KG/M2 | TEMPERATURE: 98.9 F | DIASTOLIC BLOOD PRESSURE: 66 MMHG

## 2018-08-09 DIAGNOSIS — R41.3 MEMORY CHANGE: Primary | ICD-10-CM

## 2018-08-09 DIAGNOSIS — Z86.73 HISTORY OF STROKE: ICD-10-CM

## 2018-08-09 PROCEDURE — 99214 OFFICE O/P EST MOD 30 MIN: CPT | Performed by: INTERNAL MEDICINE

## 2018-08-09 RX ORDER — DONEPEZIL HYDROCHLORIDE 10 MG/1
10 TABLET, FILM COATED ORAL NIGHTLY
Qty: 30 TABLET | Refills: 2 | Status: SHIPPED | OUTPATIENT
Start: 2018-08-09 | End: 2018-08-31 | Stop reason: SDDI
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
## 2018-08-14 DIAGNOSIS — Z86.73 HX OF COMPLETED STROKE: Primary | ICD-10-CM

## 2018-08-15 ENCOUNTER — TELEPHONE (OUTPATIENT)
Dept: INTERNAL MEDICINE | Age: 83
End: 2018-08-15

## 2018-08-15 NOTE — TELEPHONE ENCOUNTER
"Pt's niece, Anila Plummer, called with questions and information regarding the patient.  She is asking if Dr. Longo knows \"what caused the pt's strokes\"? What type of strokes did she have?  Pt and niece both are wanting to know if the pt \"really needs\" the ultrasound of her neck (arteries)?    Anila said, the plan for now for the patient at home, is Anila is with her through the day and then her  stops by in the pm to check on her. Pt wears a medical alert at all times.  They checked out a nursing home recently, but for now they are trying to keep her at home and thinks that's the best for now.    Anila's # [phone]  Thanks SP  "

## 2018-08-16 NOTE — TELEPHONE ENCOUNTER
Please call: Strokes are classified as ischemic, nonhemorrhagic.  If they do not want to do the ultrasound please documented in the record.  It will however help to decide if the carotid arteries are source of emboli which lead to additional strokes.

## 2018-08-24 ENCOUNTER — HOSPITAL ENCOUNTER (OUTPATIENT)
Dept: CARDIOLOGY | Facility: HOSPITAL | Age: 83
Discharge: HOME | End: 2018-08-24
Admitting: INTERNAL MEDICINE

## 2018-08-24 LAB
BH CV XLRA MEAS LEFT CCA RATIO VEL: 63.6 CM/SEC
BH CV XLRA MEAS LEFT DIST CCA EDV: 8.3 CM/SEC
BH CV XLRA MEAS LEFT DIST CCA PSV: 63.6 CM/SEC
BH CV XLRA MEAS LEFT DIST ICA EDV: -12.2 CM/SEC
BH CV XLRA MEAS LEFT DIST ICA PSV: -54.2 CM/SEC
BH CV XLRA MEAS LEFT ICA RATIO VEL: -71.1 CM/SEC
BH CV XLRA MEAS LEFT ICA/CCA RATIO: -1.1
BH CV XLRA MEAS LEFT MID ICA EDV: -16.1 CM/SEC
BH CV XLRA MEAS LEFT MID ICA PSV: -71.1 CM/SEC
BH CV XLRA MEAS LEFT PROX CCA EDV: 8.6 CM/SEC
BH CV XLRA MEAS LEFT PROX CCA PSV: 59.3 CM/SEC
BH CV XLRA MEAS LEFT PROX ECA PSV: -87.4 CM/SEC
BH CV XLRA MEAS LEFT PROX ICA EDV: -11.4 CM/SEC
BH CV XLRA MEAS LEFT PROX ICA PSV: -58.1 CM/SEC
BH CV XLRA MEAS LEFT PROX SCLA PSV: 111 CM/SEC
BH CV XLRA MEAS LEFT VERTEBRAL A EDV: 11 CM/SEC
BH CV XLRA MEAS LEFT VERTEBRAL A PSV: 43.6 CM/SEC
BH CV XLRA MEAS RIGHT CCA RATIO VEL: 44 CM/SEC
BH CV XLRA MEAS RIGHT DIST CCA EDV: 8.3 CM/SEC
BH CV XLRA MEAS RIGHT DIST CCA PSV: 44 CM/SEC
BH CV XLRA MEAS RIGHT DIST ICA EDV: -12.9 CM/SEC
BH CV XLRA MEAS RIGHT DIST ICA PSV: -49 CM/SEC
BH CV XLRA MEAS RIGHT ICA RATIO VEL: -49 CM/SEC
BH CV XLRA MEAS RIGHT ICA/CCA RATIO: -1.1
BH CV XLRA MEAS RIGHT MID ICA EDV: -12.9 CM/SEC
BH CV XLRA MEAS RIGHT MID ICA PSV: -46.9 CM/SEC
BH CV XLRA MEAS RIGHT PROX CCA EDV: 6.7 CM/SEC
BH CV XLRA MEAS RIGHT PROX CCA PSV: 55.4 CM/SEC
BH CV XLRA MEAS RIGHT PROX ECA EDV: -3.5 CM/SEC
BH CV XLRA MEAS RIGHT PROX ECA PSV: -55.8 CM/SEC
BH CV XLRA MEAS RIGHT PROX ICA EDV: -10.2 CM/SEC
BH CV XLRA MEAS RIGHT PROX ICA PSV: -35.9 CM/SEC
BH CV XLRA MEAS RIGHT PROX SCLA PSV: 97.3 CM/SEC
BH CV XLRA MEAS RIGHT VERTEBRAL A EDV: 6.9 CM/SEC
BH CV XLRA MEAS RIGHT VERTEBRAL A PSV: 22.6 CM/SEC
LEFT ARM BP: NORMAL MMHG
RIGHT ARM BP: NORMAL MMHG

## 2018-08-24 PROCEDURE — 93880 EXTRACRANIAL BILAT STUDY: CPT

## 2018-08-31 ENCOUNTER — TELEPHONE (OUTPATIENT)
Dept: INTERNAL MEDICINE | Age: 83
End: 2018-08-31

## 2018-09-07 ENCOUNTER — HOSPITAL ENCOUNTER (OUTPATIENT)
Dept: CT IMAGING | Facility: HOSPITAL | Age: 83
Discharge: HOME | End: 2018-09-07
Attending: THORACIC SURGERY (CARDIOTHORACIC VASCULAR SURGERY) | Admitting: THORACIC SURGERY (CARDIOTHORACIC VASCULAR SURGERY)

## 2018-09-07 DIAGNOSIS — R91.1 LUNG NODULE: ICD-10-CM

## 2018-09-07 PROCEDURE — 71250 CT THORAX DX C-: CPT

## 2018-09-18 ENCOUNTER — OFFICE VISIT (OUTPATIENT)
Dept: SURGERY | Facility: CLINIC | Age: 83
End: 2018-09-18

## 2018-09-18 VITALS
HEART RATE: 77 BPM | BODY MASS INDEX: 19.63 KG/M2 | HEIGHT: 57 IN | WEIGHT: 91 LBS | DIASTOLIC BLOOD PRESSURE: 69 MMHG | OXYGEN SATURATION: 95 % | SYSTOLIC BLOOD PRESSURE: 137 MMHG

## 2018-09-18 DIAGNOSIS — C34.10 MALIGNANT NEOPLASM OF BRONCHUS OF UPPER LOBE, UNSPECIFIED LATERALITY (HCC): Primary | ICD-10-CM

## 2018-09-18 PROCEDURE — 99213 OFFICE O/P EST LOW 20 MIN: CPT | Performed by: THORACIC SURGERY (CARDIOTHORACIC VASCULAR SURGERY)

## 2018-09-18 NOTE — PROGRESS NOTES
Subjective   Patient ID: Karla Lambert is a 85 y.o. female is here today for follow-up.    History of Present Illness  Dear Colleague,  Karla Lambert was seen in our office today for follow-up of a right upper lobe of lung cancer treated treated with primary radiation therapy.  In January of this year of the area seemed to have changed on CT scan and a PET scan was obtained.  This showed no hypermetabolic uptake.  We have been watching this area.  She reports no cough or hemoptysis.  She has no pleuritic pain.  She has no hoarseness or change in her voice.  She has had no unexplained weight loss.  She does get short of breath but she leads a very sedentary life    The following portions of the patient's history were reviewed and updated as appropriate: allergies, current medications, past family history, past medical history, past social history, past surgical history and problem list.  Review of Systems   Constitution: Negative.   HENT: Negative.    Eyes: Negative.    Cardiovascular: Negative.    Respiratory: Negative.    Endocrine: Negative.    Hematologic/Lymphatic: Negative.    Skin: Negative.    Musculoskeletal: Negative.    Gastrointestinal: Negative.    Genitourinary: Negative.    Neurological: Negative.    Psychiatric/Behavioral: Negative.      Patient Active Problem List   Diagnosis   • Anxiety   • Chronic obstructive pulmonary disease (CMS/HCC)   • Hyperlipidemia   • Malignant neoplasm of bronchus of upper lobe (CMS/HCC)   • Abnormal loss of weight   • S/P aortic valve replacement with bioprosthetic valve   • Routine health maintenance   • Mastalgia in female   • Lung nodule     Past Medical History:   Diagnosis Date   • Anxiety 6/14/2016   • Aortic valve stenosis 8/9/2013   • Benign essential hypertension 8/9/2013   • Bicuspid aortic valve 8/9/2013   • Chronic obstructive pulmonary disease (CMS/HCC) 6/14/2016   • Hyperlipidemia 6/14/2016   • Lung cancer (CMS/HCC) 2005     Past Surgical History:    Procedure Laterality Date   • AORTIC VALVE REPAIR/REPLACEMENT      History of bicuspid valve .  DR. RAMIRES, valve replacement with PIG valve.    • COLONOSCOPY      x1  does not remember date    • FOOT SURGERY     • LUNG BIOPSY      Lung biopsy by Dr. Church= lung cancer.  Treated with irradiation, no chemotherapy.     Family History   Problem Relation Age of Onset   • Cervical cancer Mother 80         in , at age 81.   • No Known Problems Father    • No Known Problems Sister    • No Known Problems Brother    • No Known Problems Daughter    • No Known Problems Son    • No Known Problems Maternal Grandmother    • No Known Problems Paternal Grandmother    • No Known Problems Maternal Aunt    • No Known Problems Paternal Aunt    • Breast cancer Other    • BRCA 1/2 Neg Hx    • Colon cancer Neg Hx    • Endometrial cancer Neg Hx    • Ovarian cancer Neg Hx      Social History     Social History   • Marital status:      Spouse name: GISELE   • Number of children: 0   • Years of education: N/A     Occupational History   • Not on file.     Social History Main Topics   • Smoking status: Former Smoker     Packs/day: 0.25     Years: 50.00     Types: Cigarettes     Quit date: 5/15/2018   • Smokeless tobacco: Never Used      Comment: Now has COPD and lung cancer.   • Alcohol use No   • Drug use: No   • Sexual activity: Defer      Comment:       Other Topics Concern   • Not on file     Social History Narrative   • No narrative on file       Current Outpatient Prescriptions:   •  Calcium Carbonate-Vitamin D (CALCIUM 500 + D) 500-125 MG-UNIT tablet, Take  by mouth daily., Disp: , Rfl:   •  cyanocobalamin 100 MCG tablet, Take  by mouth daily., Disp: , Rfl:   •  Potassium Gluconate 550 MG tablet, Take 1 tablet by mouth daily., Disp: , Rfl:   •  pyridoxine (VITAMIN B-6) 100 MG tablet, Take  by mouth., Disp: , Rfl:   •  simvastatin (ZOCOR) 20 MG tablet, TAKE 1 TABLET BY MOUTH EVERY NIGHT., Disp: 90  tablet, Rfl: 1  Allergies   Allergen Reactions   • Morphine Sulfate    • Penicillins         Objective   Vitals:    09/18/18 1326   BP: 137/69   Pulse: 77   SpO2: 95%     Physical Exam   Constitutional: She is oriented to person, place, and time. She appears well-developed and well-nourished.   HENT:   Head: Normocephalic.   Eyes: Pupils are equal, round, and reactive to light. Conjunctivae, EOM and lids are normal.   Neck: Trachea normal and normal range of motion. Neck supple. No hepatojugular reflux and no JVD present. Carotid bruit is not present. No thyroid mass and no thyromegaly present.   Cardiovascular: Normal rate, regular rhythm, S1 normal, S2 normal, normal heart sounds and normal pulses.   No extrasystoles are present. PMI is not displaced.    Pulmonary/Chest: Effort normal and breath sounds normal.   Abdominal: Soft. Normal appearance and bowel sounds are normal. She exhibits no mass. There is no hepatosplenomegaly. There is no tenderness. No hernia.   Musculoskeletal: Normal range of motion.   Significant kyphoscoliosis   Neurological: She is alert and oriented to person, place, and time. She has normal strength and normal reflexes. No cranial nerve deficit or sensory deficit. She displays a negative Romberg sign.   Skin: Skin is warm, dry and intact.   Psychiatric: She has a normal mood and affect. Her speech is normal and behavior is normal. Judgment and thought content normal. Cognition and memory are normal.     Independent Review of Radiographic Studies:    CT scan of the chest performed September 7, 2018 was independently reviewed and compared to CT scans from December 2017 and May 2018.  The pleural based consolidation in the apex of the right upper lobe is unchanged.  There are no new infiltrates nodules or masses.  There is no significant hilar or mediastinal adenopathy.  There is no pleural effusion.      Assessment/Plan      There is been no change in this consolidation in the right upper  lobe and I suspect that this is related to radiation changes. We will continue to follow this conservatively.  A repeat CT of the chest will be performed in 6 months.  I will be glad to see her sooner if you think it's necessary. Thank you for allowing me to participate in the care of Mrs. Lambert.    Diagnoses and all orders for this visit:    Malignant neoplasm of bronchus of upper lobe, unspecified laterality (CMS/HCC)  -     CT Chest Without Contrast; Future

## 2018-10-05 ENCOUNTER — OFFICE VISIT (OUTPATIENT)
Dept: INTERNAL MEDICINE | Age: 83
End: 2018-10-05

## 2018-10-05 VITALS
BODY MASS INDEX: 19.98 KG/M2 | TEMPERATURE: 98.1 F | WEIGHT: 92.6 LBS | HEIGHT: 57 IN | HEART RATE: 76 BPM | SYSTOLIC BLOOD PRESSURE: 150 MMHG | DIASTOLIC BLOOD PRESSURE: 70 MMHG | OXYGEN SATURATION: 96 %

## 2018-10-05 DIAGNOSIS — R41.3 MEMORY CHANGE: Primary | ICD-10-CM

## 2018-10-05 DIAGNOSIS — Z23 NEEDS FLU SHOT: ICD-10-CM

## 2018-10-05 DIAGNOSIS — I10 ESSENTIAL HYPERTENSION: ICD-10-CM

## 2018-10-05 DIAGNOSIS — Z23 NEEDS FLU SHOT: Primary | ICD-10-CM

## 2018-10-05 PROCEDURE — 90662 IIV NO PRSV INCREASED AG IM: CPT | Performed by: INTERNAL MEDICINE

## 2018-10-05 PROCEDURE — 99213 OFFICE O/P EST LOW 20 MIN: CPT | Performed by: INTERNAL MEDICINE

## 2018-10-05 PROCEDURE — G0008 ADMIN INFLUENZA VIRUS VAC: HCPCS | Performed by: INTERNAL MEDICINE

## 2018-10-05 RX ORDER — ASPIRIN 81 MG/1
81 TABLET ORAL DAILY
COMMUNITY

## 2018-10-05 RX ORDER — ENALAPRIL MALEATE 5 MG/1
5 TABLET ORAL DAILY
Qty: 30 TABLET | Refills: 1 | Status: SHIPPED | OUTPATIENT
Start: 2018-10-05 | End: 2018-11-12

## 2018-10-05 RX ORDER — DONEPEZIL HYDROCHLORIDE 10 MG/1
TABLET, FILM COATED ORAL
Refills: 2 | COMMUNITY
Start: 2018-09-04 | End: 2018-10-15 | Stop reason: SINTOL

## 2018-10-08 ENCOUNTER — TELEPHONE (OUTPATIENT)
Dept: INTERNAL MEDICINE | Age: 83
End: 2018-10-08

## 2018-10-08 NOTE — TELEPHONE ENCOUNTER
Pt was seen on [facility] 10/5/18 for memory change.  prescribed Aricept 10mg. Pt vomited 1x on Sunday, 10/7/18 & has been seeing things above her door since Saturday, 10/6/18.    Pt told Anila(POA/great niece) that she was drinking coffee after she took the Aricept on Sunday & thinks that is what caused the pt to be sick.     Pls advise.    Pls call Anila #[phone].

## 2018-10-08 NOTE — TELEPHONE ENCOUNTER
I would suggest she try this for another day or 2 but with water and take with food.  Contact us later this week with an update on symptoms.

## 2018-10-15 ENCOUNTER — OFFICE VISIT (OUTPATIENT)
Dept: INTERNAL MEDICINE | Age: 83
End: 2018-10-15

## 2018-10-15 VITALS
BODY MASS INDEX: 17.99 KG/M2 | OXYGEN SATURATION: 97 % | TEMPERATURE: 98 F | DIASTOLIC BLOOD PRESSURE: 70 MMHG | WEIGHT: 83.4 LBS | HEART RATE: 70 BPM | HEIGHT: 57 IN | SYSTOLIC BLOOD PRESSURE: 110 MMHG

## 2018-10-15 DIAGNOSIS — R53.83 MALAISE AND FATIGUE: Primary | ICD-10-CM

## 2018-10-15 DIAGNOSIS — R53.81 MALAISE AND FATIGUE: Primary | ICD-10-CM

## 2018-10-15 PROCEDURE — 99213 OFFICE O/P EST LOW 20 MIN: CPT | Performed by: INTERNAL MEDICINE

## 2018-10-15 NOTE — PROGRESS NOTES
"[facility] INTERNAL MEDICINE  MD Valentina GREENEramesh WHEELER Fausto / 85 y.o. / female  10/15/2018      ASSESSMENT & PLAN:    Problem List Items Addressed This Visit     None      Visit Diagnoses     Malaise and fatigue    -  Primary        No orders of the defined types were placed in this encounter.      Summary/Discussion:  Number-one malaise, most likely due to medication side effect.  Recommend that we discontinue Aricept as of today, and patient's family will contact me by phone in about a week to 10 days with an update on symptom resolution.  If we are correct with this assumption I suspect that by next Monday she should be back to her baseline.      No Follow-up on file.    ____________________________________________________________________    VITALS    Visit Vitals  /70   Pulse 70   Temp 98 °F (36.7 °C)   Ht 143.7 cm (56.58\")   Wt 37.8 kg (83 lb 6.4 oz)   SpO2 97%   BMI 18.32 kg/m²       BP Readings from Last 3 Encounters:   10/15/18 110/70   10/05/18 150/70   09/18/18 137/69     Wt Readings from Last 3 Encounters:   10/15/18 37.8 kg (83 lb 6.4 oz)   10/05/18 42 kg (92 lb 9.6 oz)   09/18/18 41.3 kg (91 lb)      Body mass index is 18.32 kg/m².    CC:  Main reason(s) for today's visit: Weight Loss and Fatigue      HPI:     Patient presents afternoon with issues temporally related to the use of Aricept.  Patient notes some symptoms of depression, lack of appetite, psychomotor retardation, weight loss, but no significant nausea or vomiting.  Patient is lost about 8 pounds since her last visit.  They suspected that the symptoms were secondary to medication, and he did reduce the Aricept dosage by 50% yesterday, patient seems a little better today.    Patient Care Team:  Moiz Longo MD as PCP - General  Moiz Longo MD as PCP - Family Medicine  Linker, Eliot TANG III, MD as Surgeon (Thoracic Surgery)  ____________________________________________________________________    REVIEW OF SYSTEMS    Review of " Systems   Constitutional: Positive for appetite change and unexpected weight change.   Respiratory: Negative for shortness of breath.    Cardiovascular: Negative for chest pain.   Gastrointestinal: Negative for abdominal pain.   Psychiatric/Behavioral: Positive for sleep disturbance.         PHYSICAL EXAMINATION    Physical Exam   Constitutional: She is oriented to person, place, and time. She appears well-developed and well-nourished. No distress.   Cardiovascular: Normal rate, regular rhythm, normal heart sounds and intact distal pulses.  Exam reveals no gallop and no friction rub.    No murmur heard.  Pulmonary/Chest: Effort normal and breath sounds normal. She has no wheezes. She has no rales.   Abdominal: Soft. Bowel sounds are normal. There is no tenderness.   Musculoskeletal: She exhibits no edema.   Neurological: She is alert and oriented to person, place, and time.   Skin: Skin is warm and dry. No rash noted.   Psychiatric: She has a normal mood and affect. Her behavior is normal. Judgment and thought content normal.   Nursing note and vitals reviewed.        REVIEWED DATA:    Labs:     Lab Results   Component Value Date     05/25/2018    K 4.3 05/25/2018    AST 21 05/25/2018    ALT 13 05/25/2018    BUN 11 05/25/2018    CREATININE 0.80 05/25/2018    CREATININE 0.74 05/25/2018    CREATININE 0.72 01/17/2016    EGFRIFNONA 75 05/25/2018    EGFRIFAFRI 90 05/25/2018       Lab Results   Component Value Date    GLUCOSE 87 01/17/2016       Lab Results   Component Value Date     (H) 05/25/2018    LDL 93 08/19/2016    HDL 90 (H) 05/25/2018    TRIG 79 05/25/2018       Lab Results   Component Value Date    TSH 0.701 07/13/2018       Lab Results   Component Value Date    WBC 7.30 01/17/2016    HGB 12.0 05/25/2018    HGB 12.5 01/17/2016     05/25/2018       No results found for: PSA      Imaging:         Medical Tests:         Summary of old records / correspondence / consultant report:         Request  outside records:         ALLERGIES  Allergies   Allergen Reactions   • Morphine Sulfate    • Penicillins         MEDICATIONS  Current Outpatient Prescriptions   Medication Sig Dispense Refill   • aspirin 81 MG EC tablet Take 81 mg by mouth Daily.     • Calcium Carbonate-Vitamin D (CALCIUM 500 + D) 500-125 MG-UNIT tablet Take  by mouth daily.     • cyanocobalamin 100 MCG tablet Take  by mouth daily.     • enalapril (VASOTEC) 5 MG tablet Take 1 tablet by mouth Daily. 30 tablet 1   • Potassium Gluconate 550 MG tablet Take 1 tablet by mouth daily.     • pyridoxine (VITAMIN B-6) 100 MG tablet Take  by mouth.     • simvastatin (ZOCOR) 20 MG tablet TAKE 1 TABLET BY MOUTH EVERY NIGHT. 90 tablet 1     No current facility-administered medications for this visit.        PFSH:     The following portions of the patient's history were reviewed and updated as appropriate: Allergies / Current Medications / Past Medical History / Surgical History / Social History / Family History    PROBLEM LIST   Patient Active Problem List   Diagnosis   • Anxiety   • Chronic obstructive pulmonary disease (CMS/HCC)   • Hyperlipidemia   • Malignant neoplasm of bronchus of upper lobe (CMS/HCC)   • Abnormal loss of weight   • S/P aortic valve replacement with bioprosthetic valve   • Routine health maintenance   • Mastalgia in female   • Lung nodule       PAST MEDICAL HISTORY  Past Medical History:   Diagnosis Date   • Anxiety 6/14/2016   • Aortic valve stenosis 8/9/2013   • Benign essential hypertension 8/9/2013   • Bicuspid aortic valve 8/9/2013   • Chronic obstructive pulmonary disease (CMS/HCC) 6/14/2016   • Hyperlipidemia 6/14/2016   • Lung cancer (CMS/HCC) 2005       SURGICAL HISTORY  Past Surgical History:   Procedure Laterality Date   • AORTIC VALVE REPAIR/REPLACEMENT  2007    History of bicuspid valve .  DR. RAMIRES, valve replacement with PIG valve.    • COLONOSCOPY      x1  does not remember date    • FOOT SURGERY     • LUNG BIOPSY  2007     Lung biopsy by Dr. Church= lung cancer.  Treated with irradiation, no chemotherapy.       SOCIAL HISTORY  Social History     Social History   • Marital status:      Spouse name: GISELE   • Number of children: 0     Social History Main Topics   • Smoking status: Former Smoker     Packs/day: 0.25     Years: 50.00     Types: Cigarettes     Quit date: 5/15/2018   • Smokeless tobacco: Never Used      Comment: Now has COPD and lung cancer.   • Alcohol use No   • Drug use: No   • Sexual activity: Defer      Comment:       Other Topics Concern   • Not on file       FAMILY HISTORY  Family History   Problem Relation Age of Onset   • Cervical cancer Mother 80         in , at age 81.   • No Known Problems Father    • No Known Problems Sister    • No Known Problems Brother    • No Known Problems Daughter    • No Known Problems Son    • No Known Problems Maternal Grandmother    • No Known Problems Paternal Grandmother    • No Known Problems Maternal Aunt    • No Known Problems Paternal Aunt    • Breast cancer Other    • BRCA 1/2 Neg Hx    • Colon cancer Neg Hx    • Endometrial cancer Neg Hx    • Ovarian cancer Neg Hx        Health Maintenance Due   Topic   • ZOSTER VACCINE (2 of 2)       Overdue health maintenance interventions  reviewed with patient.    Dictated utilizing Dragon voice recognition software

## 2018-11-12 ENCOUNTER — OFFICE VISIT (OUTPATIENT)
Dept: INTERNAL MEDICINE | Age: 83
End: 2018-11-12

## 2018-11-12 VITALS
BODY MASS INDEX: 18.25 KG/M2 | TEMPERATURE: 97.8 F | DIASTOLIC BLOOD PRESSURE: 58 MMHG | OXYGEN SATURATION: 98 % | HEART RATE: 80 BPM | WEIGHT: 84.6 LBS | HEIGHT: 57 IN | SYSTOLIC BLOOD PRESSURE: 104 MMHG

## 2018-11-12 DIAGNOSIS — R53.83 OTHER FATIGUE: Primary | ICD-10-CM

## 2018-11-12 DIAGNOSIS — I10 ESSENTIAL HYPERTENSION: ICD-10-CM

## 2018-11-12 PROCEDURE — 99213 OFFICE O/P EST LOW 20 MIN: CPT | Performed by: INTERNAL MEDICINE

## 2018-11-12 RX ORDER — ENALAPRIL MALEATE 5 MG/1
2.5 TABLET ORAL DAILY
Qty: 30 TABLET | Refills: 1 | COMMUNITY
Start: 2018-11-12 | End: 2019-01-02 | Stop reason: SDUPTHER

## 2018-11-12 RX ORDER — TRAZODONE HYDROCHLORIDE 50 MG/1
150 TABLET ORAL NIGHTLY
Qty: 15 TABLET | Refills: 1 | Status: SHIPPED | OUTPATIENT
Start: 2018-11-12

## 2018-11-12 NOTE — PROGRESS NOTES
"[facility] [place] [place] INTERNAL MEDICINE  MD Karla GREENE Fausto / 85 y.o. / female  11/12/2018      ASSESSMENT & PLAN:    Problem List Items Addressed This Visit     None      Visit Diagnoses     Other fatigue    -  Primary    Relevant Medications    traZODone (DESYREL) 50 MG tablet    Essential hypertension        Relevant Medications    enalapril (VASOTEC) 5 MG tablet        No orders of the defined types were placed in this encounter.      Summary/Discussion:    Number-one fatigue I suspect that this is multifactorial in etiology, most likely due to lack of sleep, may also be contributed to by relative hypotension due to over treatment with medication, along with possible medication side effect.  We will reduced dosage of enalapril to 2.5 mg per day, discontinue simvastatin, continue dietary supplement, and add trazodone 50 mg for sleep.  Patient is advised to use is approximate 3040 minutes before she wants to go to sleep.  Family will contact me in about a week with an update on symptoms.    #2 hypertension overly strict control based on current meds, reduced dosage of meds (enalapril to 2.5 mg) as above.    Follow-up will be determined after we speak next week.      Return if symptoms worsen or fail to improve, for Blood pressure check.    ____________________________________________________________________    VITALS    Visit Vitals  /58   Pulse 80   Temp 97.8 °F (36.6 °C)   Ht 143.7 cm (56.58\")   Wt 38.4 kg (84 lb 9.6 oz)   SpO2 98%   BMI 18.58 kg/m²       BP Readings from Last 3 Encounters:   11/12/18 104/58   10/15/18 110/70   10/05/18 150/70     Wt Readings from Last 3 Encounters:   11/12/18 38.4 kg (84 lb 9.6 oz)   10/15/18 37.8 kg (83 lb 6.4 oz)   10/05/18 42 kg (92 lb 9.6 oz)      Body mass index is 18.58 kg/m².    CC:  Main reason(s) for today's visit: Follow-up (fatigue, insomnia)      HPI:     Patient presents this afternoon in follow-up of our last office visit.  When seen by me in October, " patient had issues with fatigue, and decreased appetite while using Aricept.  We discontinue the medication, appetite improved, but fatigue did not.  Patient is not sleeping well at night.  There are some issues ongoing with her present living situation in a condominium setting and the inability to have issues resolved.  She denied any symptoms of upper respiratory, gastroenteritis, urinary tract infection.  Thyroid function as well as hemoglobin were recently evaluated within the past 5-6 months and were normal.  I suspect that if we were to allow her to get some rest at night, that her symptoms of fatigue would improve during the day.  Of also suggest that we discontinue simvastatin cases any myalgia type symptoms ongoing.  Have also recommended that we reduce the dosage of Vasotec from 5 mg to 2.5 mg a day because of the relative hypotension which can also add to fatigue.  Is actually gained a pound since our last visit, but remains at 8 pounds down from her base weight.  Patient has been using a dietary supplement at least 1 can a day.    Patient Care Team:  Moiz Longo MD as PCP - General  Moiz Longo MD as PCP - Family Medicine  Linker, Eliot TANG III, MD as Surgeon (Thoracic Surgery)  ____________________________________________________________________    REVIEW OF SYSTEMS    Review of Systems   HENT: Negative for ear pain, postnasal drip and sore throat.    Respiratory: Negative for cough.    Cardiovascular:        No PND nor orthopnea noted   Gastrointestinal: Negative for diarrhea, nausea and vomiting.   Genitourinary: Negative for dysuria.         PHYSICAL EXAMINATION    Physical Exam   Constitutional: She is oriented to person, place, and time. She appears well-developed and well-nourished. No distress.   Cardiovascular: Normal rate, regular rhythm, normal heart sounds and intact distal pulses. Exam reveals no gallop and no friction rub.   No murmur heard.  Pulmonary/Chest: Effort normal and  breath sounds normal. She has no wheezes. She has no rales.   Musculoskeletal: She exhibits no edema.   Neurological: She is alert and oriented to person, place, and time.   Skin: Skin is warm and dry. No rash noted.   Psychiatric: She has a normal mood and affect. Her behavior is normal. Judgment and thought content normal.   Nursing note and vitals reviewed.        REVIEWED DATA:    Labs:     Lab Results   Component Value Date     05/25/2018    K 4.3 05/25/2018    AST 21 05/25/2018    ALT 13 05/25/2018    BUN 11 05/25/2018    CREATININE 0.80 05/25/2018    CREATININE 0.74 05/25/2018    CREATININE 0.72 01/17/2016    EGFRIFNONA 75 05/25/2018    EGFRIFAFRI 90 05/25/2018       Lab Results   Component Value Date    GLUCOSE 87 01/17/2016       Lab Results   Component Value Date     (H) 05/25/2018    LDL 93 08/19/2016    HDL 90 (H) 05/25/2018    TRIG 79 05/25/2018       Lab Results   Component Value Date    TSH 0.701 07/13/2018       Lab Results   Component Value Date    WBC 7.30 01/17/2016    HGB 12.0 05/25/2018    HGB 12.5 01/17/2016     05/25/2018       No results found for: PSA      Imaging:         Medical Tests:         Summary of old records / correspondence / consultant report:         Request outside records:         ALLERGIES  Allergies   Allergen Reactions   • Morphine Sulfate    • Penicillins         MEDICATIONS  Current Outpatient Medications   Medication Sig Dispense Refill   • aspirin 81 MG EC tablet Take 81 mg by mouth Daily.     • enalapril (VASOTEC) 5 MG tablet Take 0.5 tablets by mouth Daily. 30 tablet 1   • traZODone (DESYREL) 50 MG tablet Take 3 tablets by mouth Every Night. 15 tablet 1     No current facility-administered medications for this visit.        PFSH:     The following portions of the patient's history were reviewed and updated as appropriate: Allergies / Current Medications / Past Medical History / Surgical History / Social History / Family History    PROBLEM LIST    Patient Active Problem List   Diagnosis   • Anxiety   • Chronic obstructive pulmonary disease (CMS/HCC)   • Hyperlipidemia   • Malignant neoplasm of bronchus of upper lobe (CMS/HCC)   • Abnormal loss of weight   • S/P aortic valve replacement with bioprosthetic valve   • Routine health maintenance   • Mastalgia in female   • Lung nodule       PAST MEDICAL HISTORY  Past Medical History:   Diagnosis Date   • Anxiety 2016   • Aortic valve stenosis 2013   • Benign essential hypertension 2013   • Bicuspid aortic valve 2013   • Chronic obstructive pulmonary disease (CMS/HCC) 2016   • Hyperlipidemia 2016   • Lung cancer (CMS/HCC)        SURGICAL HISTORY  Past Surgical History:   Procedure Laterality Date   • AORTIC VALVE REPAIR/REPLACEMENT      History of bicuspid valve .  DR. RAMIRES, valve replacement with PIG valve.    • COLONOSCOPY      x1  does not remember date    • FOOT SURGERY     • LUNG BIOPSY      Lung biopsy by Dr. Church= lung cancer.  Treated with irradiation, no chemotherapy.       SOCIAL HISTORY  Social History     Socioeconomic History   • Marital status:      Spouse name: GISELE   • Number of children: 0   • Years of education: Not on file   • Highest education level: Not on file   Tobacco Use   • Smoking status: Former Smoker     Packs/day: 0.25     Years: 50.00     Pack years: 12.50     Types: Cigarettes     Last attempt to quit: 5/15/2018     Years since quittin.4   • Smokeless tobacco: Never Used   • Tobacco comment: Now has COPD and lung cancer.   Substance and Sexual Activity   • Alcohol use: No   • Drug use: No   • Sexual activity: Defer     Comment:         FAMILY HISTORY  Family History   Problem Relation Age of Onset   • Cervical cancer Mother 80         in , at age 81.   • No Known Problems Father    • No Known Problems Sister    • No Known Problems Brother    • No Known Problems Daughter    • No Known Problems Son    • No Known  Problems Maternal Grandmother    • No Known Problems Paternal Grandmother    • No Known Problems Maternal Aunt    • No Known Problems Paternal Aunt    • Breast cancer Other    • BRCA 1/2 Neg Hx    • Colon cancer Neg Hx    • Endometrial cancer Neg Hx    • Ovarian cancer Neg Hx        Health Maintenance Due   Topic   • ZOSTER VACCINE (2 of 2)       Overdue health maintenance interventions  reviewed with patient.    Dictated utilizing Dragon voice recognition software

## 2018-11-18 ENCOUNTER — APPOINTMENT (OUTPATIENT)
Dept: GENERAL RADIOLOGY | Facility: HOSPITAL | Age: 83
End: 2018-11-18

## 2018-11-18 ENCOUNTER — APPOINTMENT (OUTPATIENT)
Dept: CT IMAGING | Facility: HOSPITAL | Age: 83
End: 2018-11-18

## 2018-11-18 ENCOUNTER — HOSPITAL ENCOUNTER (OUTPATIENT)
Facility: HOSPITAL | Age: 83
Setting detail: OBSERVATION
Discharge: NURSING, REHAB OR LONG-TERM CARE | End: 2018-11-20
Attending: EMERGENCY MEDICINE | Admitting: HOSPITALIST

## 2018-11-18 DIAGNOSIS — R26.2 DIFFICULTY WALKING: ICD-10-CM

## 2018-11-18 DIAGNOSIS — S42.001A CLOSED NONDISPLACED FRACTURE OF RIGHT CLAVICLE, UNSPECIFIED PART OF CLAVICLE, INITIAL ENCOUNTER: ICD-10-CM

## 2018-11-18 DIAGNOSIS — E87.6 HYPOKALEMIA: ICD-10-CM

## 2018-11-18 DIAGNOSIS — T79.6XXA TRAUMATIC RHABDOMYOLYSIS, INITIAL ENCOUNTER (HCC): Primary | ICD-10-CM

## 2018-11-18 DIAGNOSIS — T79.6XXD TRAUMATIC RHABDOMYOLYSIS, SUBSEQUENT ENCOUNTER: ICD-10-CM

## 2018-11-18 PROBLEM — F03.90 DEMENTIA (HCC): Chronic | Status: ACTIVE | Noted: 2018-11-18

## 2018-11-18 PROBLEM — D72.829 LEUKOCYTOSIS: Status: ACTIVE | Noted: 2018-11-18

## 2018-11-18 PROBLEM — I10 HTN (HYPERTENSION): Chronic | Status: ACTIVE | Noted: 2018-11-18

## 2018-11-18 PROBLEM — S42.034A CLOSED NONDISPLACED FRACTURE OF ACROMIAL END OF RIGHT CLAVICLE: Status: ACTIVE | Noted: 2018-11-18
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
## 2018-11-18 PROBLEM — N39.0 UTI (URINARY TRACT INFECTION): Status: ACTIVE | Noted: 2018-11-18

## 2018-11-18 LAB
ANION GAP SERPL CALCULATED.3IONS-SCNC: 16.1 MMOL/L
BACTERIA UR QL AUTO: ABNORMAL /HPF
BASOPHILS # BLD AUTO: 0.02 10*3/MM3 (ref 0–0.2)
BASOPHILS NFR BLD AUTO: 0.2 % (ref 0–1.5)
BILIRUB UR QL STRIP: NEGATIVE
BUN BLD-MCNC: 25 MG/DL (ref 8–23)
BUN/CREAT SERPL: 26.9 (ref 7–25)
CALCIUM SPEC-SCNC: 9.4 MG/DL (ref 8.6–10.5)
CHLORIDE SERPL-SCNC: 100 MMOL/L (ref 98–107)
CK SERPL-CCNC: 1641 U/L (ref 20–180)
CLARITY UR: CLEAR
CO2 SERPL-SCNC: 22.9 MMOL/L (ref 22–29)
COLOR UR: YELLOW
CREAT BLD-MCNC: 0.93 MG/DL (ref 0.57–1)
DEPRECATED RDW RBC AUTO: 47.3 FL (ref 37–54)
EOSINOPHIL # BLD AUTO: 0 10*3/MM3 (ref 0–0.7)
EOSINOPHIL NFR BLD AUTO: 0 % (ref 0.3–6.2)
ERYTHROCYTE [DISTWIDTH] IN BLOOD BY AUTOMATED COUNT: 13.7 % (ref 11.7–13)
GFR SERPL CREATININE-BSD FRML MDRD: 57 ML/MIN/1.73
GLUCOSE BLD-MCNC: 125 MG/DL (ref 65–99)
GLUCOSE UR STRIP-MCNC: NEGATIVE MG/DL
HCT VFR BLD AUTO: 38.9 % (ref 35.6–45.5)
HGB BLD-MCNC: 12.8 G/DL (ref 11.9–15.5)
HGB UR QL STRIP.AUTO: ABNORMAL
HOLD SPECIMEN: NORMAL
HOLD SPECIMEN: NORMAL
HYALINE CASTS UR QL AUTO: ABNORMAL /LPF
IMM GRANULOCYTES # BLD: 0.05 10*3/MM3 (ref 0–0.03)
IMM GRANULOCYTES NFR BLD: 0.4 % (ref 0–0.5)
KETONES UR QL STRIP: ABNORMAL
LEUKOCYTE ESTERASE UR QL STRIP.AUTO: ABNORMAL
LYMPHOCYTES # BLD AUTO: 0.5 10*3/MM3 (ref 0.9–4.8)
LYMPHOCYTES NFR BLD AUTO: 4 % (ref 19.6–45.3)
MCH RBC QN AUTO: 31.1 PG (ref 26.9–32)
MCHC RBC AUTO-ENTMCNC: 32.9 G/DL (ref 32.4–36.3)
MCV RBC AUTO: 94.6 FL (ref 80.5–98.2)
MONOCYTES # BLD AUTO: 0.89 10*3/MM3 (ref 0.2–1.2)
MONOCYTES NFR BLD AUTO: 7.1 % (ref 5–12)
NEUTROPHILS # BLD AUTO: 11.1 10*3/MM3 (ref 1.9–8.1)
NEUTROPHILS NFR BLD AUTO: 88.7 % (ref 42.7–76)
NITRITE UR QL STRIP: NEGATIVE
PH UR STRIP.AUTO: <=5 [PH] (ref 5–8)
PLATELET # BLD AUTO: 220 10*3/MM3 (ref 140–500)
PMV BLD AUTO: 10.4 FL (ref 6–12)
POTASSIUM BLD-SCNC: 4 MMOL/L (ref 3.5–5.2)
PROT UR QL STRIP: ABNORMAL
RBC # BLD AUTO: 4.11 10*6/MM3 (ref 3.9–5.2)
RBC # UR: ABNORMAL /HPF
REF LAB TEST METHOD: ABNORMAL
SODIUM BLD-SCNC: 139 MMOL/L (ref 136–145)
SP GR UR STRIP: 1.02 (ref 1–1.03)
SQUAMOUS #/AREA URNS HPF: ABNORMAL /HPF
UROBILINOGEN UR QL STRIP: ABNORMAL
WBC NRBC COR # BLD: 12.51 10*3/MM3 (ref 4.5–10.7)
WBC UR QL AUTO: ABNORMAL /HPF
WHOLE BLOOD HOLD SPECIMEN: NORMAL
WHOLE BLOOD HOLD SPECIMEN: NORMAL

## 2018-11-18 PROCEDURE — 71046 X-RAY EXAM CHEST 2 VIEWS: CPT

## 2018-11-18 PROCEDURE — 81001 URINALYSIS AUTO W/SCOPE: CPT | Performed by: EMERGENCY MEDICINE

## 2018-11-18 PROCEDURE — 73000 X-RAY EXAM OF COLLAR BONE: CPT

## 2018-11-18 PROCEDURE — 25010000002 CEFTRIAXONE PER 250 MG: Performed by: HOSPITALIST

## 2018-11-18 PROCEDURE — G0378 HOSPITAL OBSERVATION PER HR: HCPCS

## 2018-11-18 PROCEDURE — 82550 ASSAY OF CK (CPK): CPT | Performed by: EMERGENCY MEDICINE

## 2018-11-18 PROCEDURE — 99284 EMERGENCY DEPT VISIT MOD MDM: CPT

## 2018-11-18 PROCEDURE — 85025 COMPLETE CBC W/AUTO DIFF WBC: CPT | Performed by: EMERGENCY MEDICINE

## 2018-11-18 PROCEDURE — 80048 BASIC METABOLIC PNL TOTAL CA: CPT | Performed by: EMERGENCY MEDICINE

## 2018-11-18 PROCEDURE — 70450 CT HEAD/BRAIN W/O DYE: CPT

## 2018-11-18 PROCEDURE — 72125 CT NECK SPINE W/O DYE: CPT

## 2018-11-18 PROCEDURE — 87086 URINE CULTURE/COLONY COUNT: CPT | Performed by: HOSPITALIST

## 2018-11-18 RX ORDER — ONDANSETRON 2 MG/ML
4 INJECTION INTRAMUSCULAR; INTRAVENOUS EVERY 6 HOURS PRN
Status: DISCONTINUED | OUTPATIENT
Start: 2018-11-18 | End: 2018-11-20 | Stop reason: HOSPADM

## 2018-11-18 RX ORDER — SODIUM CHLORIDE 0.9 % (FLUSH) 0.9 %
3 SYRINGE (ML) INJECTION EVERY 12 HOURS SCHEDULED
Status: DISCONTINUED | OUTPATIENT
Start: 2018-11-18 | End: 2018-11-20 | Stop reason: HOSPADM

## 2018-11-18 RX ORDER — SODIUM CHLORIDE 0.9 % (FLUSH) 0.9 %
10 SYRINGE (ML) INJECTION AS NEEDED
Status: DISCONTINUED | OUTPATIENT
Start: 2018-11-18 | End: 2018-11-20 | Stop reason: HOSPADM

## 2018-11-18 RX ORDER — SODIUM CHLORIDE 0.9 % (FLUSH) 0.9 %
3-10 SYRINGE (ML) INJECTION AS NEEDED
Status: DISCONTINUED | OUTPATIENT
Start: 2018-11-18 | End: 2018-11-20 | Stop reason: HOSPADM

## 2018-11-18 RX ORDER — ONDANSETRON 4 MG/1
4 TABLET, ORALLY DISINTEGRATING ORAL EVERY 6 HOURS PRN
Status: DISCONTINUED | OUTPATIENT
Start: 2018-11-18 | End: 2018-11-20 | Stop reason: HOSPADM

## 2018-11-18 RX ORDER — SODIUM CHLORIDE 9 MG/ML
50 INJECTION, SOLUTION INTRAVENOUS CONTINUOUS
Status: DISCONTINUED | OUTPATIENT
Start: 2018-11-18 | End: 2018-11-20

## 2018-11-18 RX ORDER — CEFTRIAXONE SODIUM 1 G/50ML
1 INJECTION, SOLUTION INTRAVENOUS EVERY 24 HOURS
Status: DISCONTINUED | OUTPATIENT
Start: 2018-11-18 | End: 2018-11-19

## 2018-11-18 RX ORDER — ONDANSETRON 4 MG/1
4 TABLET, FILM COATED ORAL EVERY 6 HOURS PRN
Status: DISCONTINUED | OUTPATIENT
Start: 2018-11-18 | End: 2018-11-20 | Stop reason: HOSPADM

## 2018-11-18 RX ORDER — ACETAMINOPHEN 325 MG/1
650 TABLET ORAL EVERY 4 HOURS PRN
Status: DISCONTINUED | OUTPATIENT
Start: 2018-11-18 | End: 2018-11-20 | Stop reason: HOSPADM

## 2018-11-18 RX ORDER — ASPIRIN 81 MG/1
81 TABLET ORAL DAILY
Status: DISCONTINUED | OUTPATIENT
Start: 2018-11-18 | End: 2018-11-20 | Stop reason: HOSPADM

## 2018-11-18 RX ORDER — HYDROCODONE BITARTRATE AND ACETAMINOPHEN 5; 325 MG/1; MG/1
1 TABLET ORAL EVERY 4 HOURS PRN
Status: DISCONTINUED | OUTPATIENT
Start: 2018-11-18 | End: 2018-11-20 | Stop reason: HOSPADM

## 2018-11-18 RX ORDER — ACETAMINOPHEN 500 MG
1000 TABLET ORAL ONCE
Status: DISCONTINUED | OUTPATIENT
Start: 2018-11-18 | End: 2018-11-20 | Stop reason: HOSPADM

## 2018-11-18 RX ADMIN — SODIUM CHLORIDE 500 ML: 9 INJECTION, SOLUTION INTRAVENOUS at 13:09

## 2018-11-18 RX ADMIN — CEFTRIAXONE SODIUM 1 G: 1 INJECTION, SOLUTION INTRAVENOUS at 20:15

## 2018-11-18 RX ADMIN — SODIUM CHLORIDE, PRESERVATIVE FREE 3 ML: 5 INJECTION INTRAVENOUS at 20:19

## 2018-11-18 RX ADMIN — SODIUM CHLORIDE 125 ML/HR: 9 INJECTION, SOLUTION INTRAVENOUS at 16:41

## 2018-11-18 NOTE — PLAN OF CARE
Problem: Skin Injury Risk (Adult)  Goal: Identify Related Risk Factors and Signs and Symptoms  Outcome: Outcome(s) achieved Date Met: 11/18/18    Goal: Skin Health and Integrity  Outcome: Ongoing (interventions implemented as appropriate)      Problem: Fall Risk (Adult)  Goal: Identify Related Risk Factors and Signs and Symptoms  Outcome: Outcome(s) achieved Date Met: 11/18/18    Goal: Absence of Fall  Outcome: Ongoing (interventions implemented as appropriate)      Problem: Patient Care Overview  Goal: Plan of Care Review  Outcome: Ongoing (interventions implemented as appropriate)   11/18/18 1510   Coping/Psychosocial   Plan of Care Reviewed With patient;family   Plan of Care Review   Progress no change   OTHER   Outcome Summary Pt admitted to this unit from ED. R arm in sling for fracture. Family at bedside and supportive. Plans to d/c to rehab upon discharge. Accumax pump added to bed. Bed alarm on. Will await further orders and continue to monitor.      Goal: Individualization and Mutuality  Outcome: Ongoing (interventions implemented as appropriate)    Goal: Discharge Needs Assessment  Outcome: Ongoing (interventions implemented as appropriate)    Goal: Interprofessional Rounds/Family Conf  Outcome: Ongoing (interventions implemented as appropriate)      Problem: Pain, Acute (Adult)  Goal: Identify Related Risk Factors and Signs and Symptoms  Outcome: Outcome(s) achieved Date Met: 11/18/18    Goal: Acceptable Pain Control/Comfort Level  Outcome: Ongoing (interventions implemented as appropriate)

## 2018-11-18 NOTE — ED PROVIDER NOTES
EMERGENCY DEPARTMENT ENCOUNTER    Room Number:  P397/1  Date seen:  11/18/2018  Time seen: 11:37 AM  PCP: Moiz Longo MD  Historian: patient      HPI:  Chief Complaint: fall  A complete HPI/ROS/PMH/PSH/SH/FH are unobtainable due to: Pt has dementia  Context: Karla Lambert is a 85 y.o. female who presents to the ED s/p fall that happened at 0200 this morning. Pt was unable to stand up and was on the floor until she was found this morning by her neighbor, which was approximately 9 hours later. She is unsure if she hit her head but she denies losing consciousness. She complains of pain over her right clavicle that is worse with palpation and movement. Pt started trazadone one week ago and her niece states that she has been very sleepy and slightly more confused since started this medicine. She has no other complaints at this time.      Pain Location: right clavicle  Radiation: none  Quality: pain  Intensity/Severity: moderate  Duration: 9 hours  Onset quality: sudden  Timing: constant  Progression: worsening  Aggravating Factors: none  Alleviating Factors: none  Previous Episodes: none  Treatment before arrival: none  Associated Symptoms: none    PAST MEDICAL HISTORY  Active Ambulatory Problems     Diagnosis Date Noted   • Anxiety 06/14/2016   • Chronic obstructive pulmonary disease (CMS/HCC) 06/14/2016   • Hyperlipidemia 06/14/2016   • Malignant neoplasm of bronchus of upper lobe (CMS/HCC) 06/14/2016   • Abnormal loss of weight 06/14/2016   • S/P aortic valve replacement with bioprosthetic valve 08/09/2013   • Routine health maintenance 12/16/2016   • Mastalgia in female 06/03/2017   • Lung nodule 01/11/2018     Resolved Ambulatory Problems     Diagnosis Date Noted   • Acute bronchitis 06/14/2016   • Acute upper respiratory infection 06/14/2016   • Fatigue 06/14/2016   • Skin tear of elbow without complication 09/13/2016   • Aortic stenosis 08/09/2013   • Benign essential hypertension 08/09/2013   • Bicuspid  aortic valve 2013   • Weight loss 08/15/2014     Past Medical History:   Diagnosis Date   • Anxiety 2016   • Aortic valve stenosis 2013   • Benign essential hypertension 2013   • Bicuspid aortic valve 2013   • Chronic obstructive pulmonary disease (CMS/HCC) 2016   • Hyperlipidemia 2016   • Lung cancer (CMS/HCC)          PAST SURGICAL HISTORY  Past Surgical History:   Procedure Laterality Date   • AORTIC VALVE REPAIR/REPLACEMENT      History of bicuspid valve .  DR. RAMIRES, valve replacement with PIG valve.    • COLONOSCOPY      x1  does not remember date    • FOOT SURGERY     • LUNG BIOPSY      Lung biopsy by Dr. Church= lung cancer.  Treated with irradiation, no chemotherapy.         FAMILY HISTORY  Family History   Problem Relation Age of Onset   • Cervical cancer Mother 80         in , at age 81.   • No Known Problems Father    • No Known Problems Sister    • No Known Problems Brother    • No Known Problems Daughter    • No Known Problems Son    • No Known Problems Maternal Grandmother    • No Known Problems Paternal Grandmother    • No Known Problems Maternal Aunt    • No Known Problems Paternal Aunt    • Breast cancer Other    • BRCA 1/2 Neg Hx    • Colon cancer Neg Hx    • Endometrial cancer Neg Hx    • Ovarian cancer Neg Hx          SOCIAL HISTORY  Social History     Socioeconomic History   • Marital status:      Spouse name: GISELE   • Number of children: 0   • Years of education: Not on file   • Highest education level: Not on file   Social Needs   • Financial resource strain: Not on file   • Food insecurity - worry: Not on file   • Food insecurity - inability: Not on file   • Transportation needs - medical: Not on file   • Transportation needs - non-medical: Not on file   Occupational History   • Not on file   Tobacco Use   • Smoking status: Former Smoker     Packs/day: 0.25     Years: 50.00     Pack years: 12.50     Types: Cigarettes     Last  attempt to quit: 5/15/2018     Years since quittin.5   • Smokeless tobacco: Never Used   • Tobacco comment: Now has COPD and lung cancer.   Substance and Sexual Activity   • Alcohol use: No   • Drug use: No   • Sexual activity: Defer     Comment:  1988   Other Topics Concern   • Not on file   Social History Narrative   • Not on file         ALLERGIES  Morphine sulfate and Penicillins        REVIEW OF SYSTEMS  Review of Systems   Constitutional: Negative for diaphoresis and fever.   HENT: Negative for congestion.    Eyes: Negative for visual disturbance.   Respiratory: Negative for shortness of breath.    Cardiovascular: Negative for palpitations.   Gastrointestinal: Negative for blood in stool and vomiting.   Endocrine: Negative for polyuria.   Genitourinary: Negative for flank pain.   Musculoskeletal: Positive for arthralgias (right clavicle). Negative for joint swelling.   Skin: Negative for wound.   Neurological: Negative for seizures.   Hematological: Negative for adenopathy.   Psychiatric/Behavioral: Negative for sleep disturbance.            PHYSICAL EXAM  ED Triage Vitals [18 1132]   Temp Heart Rate Resp BP SpO2   98.2 °F (36.8 °C) 86 16 (!) 196/87 94 %      Temp src Heart Rate Source Patient Position BP Location FiO2 (%)   Tympanic Monitor -- -- --         GENERAL: no acute distress  HENT: nares patent, No scalp hematoma,  EYES: no scleral icterus, pupils are 4mm and reactive bilaterally,  CV: regular rhythm, normal rate  RESPIRATORY: normal effort, CTAB  ABDOMEN: soft  MUSCULOSKELETAL: no deformity, pont tenderness throughout her right clavicle. Left elbow is tender to touch and there is a 2cm superficial abrasion. No midline C-spine, L-spine, or T-spine tenderness. There is severe scoliosis. No point tenderness over hips or bilateral lower extremities.Normal motor throughout all extremities.  NEURO: alert, moves all extremities, follows commands,   SKIN: warm, dry,  ecchymosis over her  sternal notch    Vital signs and nursing notes reviewed.          LAB RESULTS  Recent Results (from the past 24 hour(s))   Urinalysis With Microscopic If Indicated (No Culture) - Urine, Catheter    Collection Time: 11/18/18 12:03 PM   Result Value Ref Range    Color, UA Yellow Yellow, Straw    Appearance, UA Clear Clear    pH, UA <=5.0 5.0 - 8.0    Specific Gravity, UA 1.023 1.005 - 1.030    Glucose, UA Negative Negative    Ketones, UA 40 mg/dL (2+) (A) Negative    Bilirubin, UA Negative Negative    Blood, UA Large (3+) (A) Negative    Protein, UA 30 mg/dL (1+) (A) Negative    Leuk Esterase, UA Trace (A) Negative    Nitrite, UA Negative Negative    Urobilinogen, UA 0.2 E.U./dL 0.2 - 1.0 E.U./dL   Urinalysis, Microscopic Only - Urine, Catheter    Collection Time: 11/18/18 12:03 PM   Result Value Ref Range    RBC, UA 31-50 (A) None Seen, 0-2 /HPF    WBC, UA 6-12 (A) None Seen, 0-2 /HPF    Bacteria, UA 2+ (A) None Seen /HPF    Squamous Epithelial Cells, UA 0-2 None Seen, 0-2 /HPF    Hyaline Casts, UA 0-2 None Seen /LPF    Methodology Manual Light Microscopy    Basic Metabolic Panel    Collection Time: 11/18/18 12:11 PM   Result Value Ref Range    Glucose 125 (H) 65 - 99 mg/dL    BUN 25 (H) 8 - 23 mg/dL    Creatinine 0.93 0.57 - 1.00 mg/dL    Sodium 139 136 - 145 mmol/L    Potassium 4.0 3.5 - 5.2 mmol/L    Chloride 100 98 - 107 mmol/L    CO2 22.9 22.0 - 29.0 mmol/L    Calcium 9.4 8.6 - 10.5 mg/dL    eGFR Non African Amer 57 (L) >60 mL/min/1.73    BUN/Creatinine Ratio 26.9 (H) 7.0 - 25.0    Anion Gap 16.1 mmol/L   CK    Collection Time: 11/18/18 12:11 PM   Result Value Ref Range    Creatine Kinase 1,641 (H) 20 - 180 U/L   CBC Auto Differential    Collection Time: 11/18/18 12:11 PM   Result Value Ref Range    WBC 12.51 (H) 4.50 - 10.70 10*3/mm3    RBC 4.11 3.90 - 5.20 10*6/mm3    Hemoglobin 12.8 11.9 - 15.5 g/dL    Hematocrit 38.9 35.6 - 45.5 %    MCV 94.6 80.5 - 98.2 fL    MCH 31.1 26.9 - 32.0 pg    MCHC 32.9 32.4 - 36.3  g/dL    RDW 13.7 (H) 11.7 - 13.0 %    RDW-SD 47.3 37.0 - 54.0 fl    MPV 10.4 6.0 - 12.0 fL    Platelets 220 140 - 500 10*3/mm3    Neutrophil % 88.7 (H) 42.7 - 76.0 %    Lymphocyte % 4.0 (L) 19.6 - 45.3 %    Monocyte % 7.1 5.0 - 12.0 %    Eosinophil % 0.0 (L) 0.3 - 6.2 %    Basophil % 0.2 0.0 - 1.5 %    Immature Grans % 0.4 0.0 - 0.5 %    Neutrophils, Absolute 11.10 (H) 1.90 - 8.10 10*3/mm3    Lymphocytes, Absolute 0.50 (L) 0.90 - 4.80 10*3/mm3    Monocytes, Absolute 0.89 0.20 - 1.20 10*3/mm3    Eosinophils, Absolute 0.00 0.00 - 0.70 10*3/mm3    Basophils, Absolute 0.02 0.00 - 0.20 10*3/mm3    Immature Grans, Absolute 0.05 (H) 0.00 - 0.03 10*3/mm3   Light Blue Top    Collection Time: 11/18/18 12:11 PM   Result Value Ref Range    Extra Tube hold for add-on    Green Top (Gel)    Collection Time: 11/18/18 12:11 PM   Result Value Ref Range    Extra Tube Hold for add-ons.    Lavender Top    Collection Time: 11/18/18 12:11 PM   Result Value Ref Range    Extra Tube hold for add-on    Gold Top - SST    Collection Time: 11/18/18 12:11 PM   Result Value Ref Range    Extra Tube Hold for add-ons.        Ordered the above labs and reviewed the results.        RADIOLOGY  Xr Chest 2 View    Result Date: 11/18/2018  Narrative: CHEST, RIGHT CLAVICLE  HISTORY: Fall with right shoulder pain.  COMPARISON: CT chest 09/07/2018, portable chest 02/18/2015  FINDINGS: RIGHT CLAVICLE: 2 VIEWS. There is an acute fracture of the distal clavicle associated with mild inferior displacement. There is no clear extension into the AC joint. No additional fracture or acute osseous abnormality is evident.  AP AND LATERAL CHEST: The cardiomediastinal silhouette is not changed. Sternotomy wires and atherosclerotic calcifications are present. There is pulmonary arterial enlargement. Within the posterior right apex there are 3 clips associated with dense pleural-based opacity that is better demonstrated on recent CT and followed with CT.      Impression: 1.  Acute mildly displaced fracture of the distal right clavicle without evidence for intra-articular extension. 2. Three surgical clips in the right lung apex adjacent to dense parenchymal opacity without evidence for change and correlated with CT 09/07/2018 though this is better followed with CT. There is no convincing evidence for active disease in the chest.  This report was finalized on 11/18/2018 1:49 PM by Dr. Chuckie Jackson M.D.      Xr Clavicle Right    Result Date: 11/18/2018  Narrative: CHEST, RIGHT CLAVICLE  HISTORY: Fall with right shoulder pain.  COMPARISON: CT chest 09/07/2018, portable chest 02/18/2015  FINDINGS: RIGHT CLAVICLE: 2 VIEWS. There is an acute fracture of the distal clavicle associated with mild inferior displacement. There is no clear extension into the AC joint. No additional fracture or acute osseous abnormality is evident.  AP AND LATERAL CHEST: The cardiomediastinal silhouette is not changed. Sternotomy wires and atherosclerotic calcifications are present. There is pulmonary arterial enlargement. Within the posterior right apex there are 3 clips associated with dense pleural-based opacity that is better demonstrated on recent CT and followed with CT.      Impression: 1. Acute mildly displaced fracture of the distal right clavicle without evidence for intra-articular extension. 2. Three surgical clips in the right lung apex adjacent to dense parenchymal opacity without evidence for change and correlated with CT 09/07/2018 though this is better followed with CT. There is no convincing evidence for active disease in the chest.  This report was finalized on 11/18/2018 1:49 PM by Dr. Chuckie Jackson M.D.      Ct Head Without Contrast    Result Date: 11/18/2018  Narrative: CT SCAN OF THE BRAIN WITHOUT CONTRAST  HISTORY: Fell. Confusion.  TECHNIQUE: The CT scan was performed as an emergency procedure through the brain without contrast.  FINDINGS: There is prominent diffuse atrophy and  chronic small vessel ischemic change similar to the study of 2018. There is no evidence of acute intracranial hemorrhage or mass effect. There is some minimal mucosal thickening in the left ethmoid air cells that is unchanged.  CT SCAN OF THE CERVICAL SPINE  HISTORY: Fell. Neck pain.  FINDINGS: The CT scan was performed as an emergency procedure through the cervical spine and demonstrates the followin. There is no evidence of acute fracture with particular reference to the odontoid. 2. There are scattered degenerative changes throughout the cervical spine including spurring of the lateral facets. This results in some mild bilateral foraminal encroachment at C6-7. 3. The patient has history of lung cancer and there is some focal irregular parenchymal and pleural change at the right apex laterally which remains stable since the CT scan of the chest dated 2018 and apparently related to post radiation changes as indicated on the PET fusion CT scan of 2018. 4. There is a fracture and some associated mottled bony destruction involving the medial head of the right clavicle. This may relate to post radiation osteonecrosis and secondary fracture or possibly metastatic disease. There was no abnormal uptake in this region on the previous PET fusion CT scan.    Radiation dose reduction techniques were utilized, including automated exposure control and exposure modulation based on body size.  This report was finalized on 2018 1:59 PM by Dr. Jax Maldonado M.D.      Ct Cervical Spine Without Contrast    Result Date: 2018  Narrative: CT SCAN OF THE BRAIN WITHOUT CONTRAST  HISTORY: Fell. Confusion.  TECHNIQUE: The CT scan was performed as an emergency procedure through the brain without contrast.  FINDINGS: There is prominent diffuse atrophy and chronic small vessel ischemic change similar to the study of 2018. There is no evidence of acute intracranial hemorrhage or mass effect. There is  some minimal mucosal thickening in the left ethmoid air cells that is unchanged.  CT SCAN OF THE CERVICAL SPINE  HISTORY: Fell. Neck pain.  FINDINGS: The CT scan was performed as an emergency procedure through the cervical spine and demonstrates the followin. There is no evidence of acute fracture with particular reference to the odontoid. 2. There are scattered degenerative changes throughout the cervical spine including spurring of the lateral facets. This results in some mild bilateral foraminal encroachment at C6-7. 3. The patient has history of lung cancer and there is some focal irregular parenchymal and pleural change at the right apex laterally which remains stable since the CT scan of the chest dated 2018 and apparently related to post radiation changes as indicated on the PET fusion CT scan of 2018. 4. There is a fracture and some associated mottled bony destruction involving the medial head of the right clavicle. This may relate to post radiation osteonecrosis and secondary fracture or possibly metastatic disease. There was no abnormal uptake in this region on the previous PET fusion CT scan.    Radiation dose reduction techniques were utilized, including automated exposure control and exposure modulation based on body size.  This report was finalized on 2018 1:59 PM by Dr. Jax Maldonado M.D.      Reviewed CXR which shows nothing acute. Independently viewed by me. Interpreted by radiologist.    Reviewed XR right clavicle which shows a fracture of her distal right clavicle. Independently viewed by me. Interpreted by radiologist.    Reviewed CT head and CT C-spine which show nothing acute. Independently viewed by me. Interpreted by radiologist. Discussed with Dr. Maldonado.        Ordered the above noted radiological studies. Reviewed by me in PACS.  Spoke with Dr. Maldonado (radiologist) regarding CT C-spine and CT head scan results.          PROCEDURES  Procedures      MEDICATIONS  GIVEN IN ER  Medications   acetaminophen (TYLENOL) tablet 1,000 mg (1,000 mg Oral Not Given 11/18/18 1310)   sodium chloride 0.9 % flush 10 mL (not administered)   sodium chloride 0.9 % infusion ( Intravenous Canceled Entry 11/18/18 1642)   aspirin EC tablet 81 mg (81 mg Oral Not Given 11/18/18 1643)   sodium chloride 0.9 % flush 3 mL (not administered)   sodium chloride 0.9 % flush 3-10 mL (not administered)   acetaminophen (TYLENOL) tablet 650 mg (not administered)   HYDROcodone-acetaminophen (NORCO) 5-325 MG per tablet 1 tablet (not administered)   ondansetron (ZOFRAN) tablet 4 mg (not administered)     Or   ondansetron ODT (ZOFRAN-ODT) disintegrating tablet 4 mg (not administered)     Or   ondansetron (ZOFRAN) injection 4 mg (not administered)   cefTRIAXone (ROCEPHIN) IVPB 1 g (not administered)   sodium chloride 0.9 % bolus 500 mL (500 mL Intravenous New Bag 11/18/18 1309)         PROGRESS AND CONSULTS       1149  Ordered labs, UA, CT head, CT C-spine, XR right clavicle, and CXR for further evaluation. Ordered tylenol for pain and IV fluids for hydration.     1249  Placed call to [facility] for admission and Ortho for consult.     1250  Rechecked Pt who is resting comfortably. Informed Pt that her XRs indicate that she has a broken collarbone. Her CK is also elevated which leaves her at risk for kidney failure. Discussed plans to admit Pt for aggressive IV fluids and we will consult Ortho regarding her clavicle. She will be placed in a sling as well. Pt understands and agrees to all plans. All questions answered.     1315  Discussed Pt's case with Dr. Strauss ([facility]) who agrees to admit Pt to telemetry for further evaluation and treatment.    1338  Discussed Pt's case with Dr. Hunt (Ortho) who agrees to consult with Pt on the floor.     MEDICAL DECISION MAKING    84 yo F w/ fall, prolonged down time, CT head / C spine neg acute.  Acute right clavicle fx, sling RUE, ortho consulted. CK elevated at 1600, consistent with  mild rhabdo, at risk for DUSTY, will admit for IV hydration.     MDM  Number of Diagnoses or Management Options     Amount and/or Complexity of Data Reviewed  Clinical lab tests: ordered and reviewed (WBC=12.51, KQ=1543)  Tests in the radiology section of CPT®: ordered and reviewed (CT head, CT C-spine, and CXR show nothing acute. R right clavicle shows a fracture of the distal clavicle.)  Discussion of test results with the performing providers: yes (Dr. Maldonaod (radiology))  Discuss the patient with other providers: yes  Independent visualization of images, tracings, or specimens: yes               DIAGNOSIS  Final diagnoses:   Closed nondisplaced fracture of right clavicle, unspecified part of clavicle, initial encounter   Traumatic rhabdomyolysis, initial encounter (CMS/[facility])         DISPOSITION  ADMISSION    Discussed treatment plan and reason for admission with pt/family and admitting physician.  Pt/family voiced understanding of the plan for admission for further testing/treatment as needed.       Latest Documented Vital Signs:  As of 6:25 PM  BP- 135/85 HR- 84 Temp- 97.9 °F (36.6 °C) (Oral) O2 sat- 92%        --  Documentation assistance provided by jm Vitale for Dr. OSMANI Padilla MD.  Information recorded by the scribe was done at my direction and has been verified and validated by me.         Herlinda Vitale  11/18/18 3070       Art Padilla MD  11/18/18 0039

## 2018-11-18 NOTE — H&P
Patient Care Team:  Moiz Longo MD as PCP - General  Moiz Longo MD as PCP - Family Medicine  Linker, Eliot TANG III, MD as Surgeon (Thoracic Surgery)    Chief complaint right shoulder pain    Subjective     Very pleasant 86yo woman brought to ER by EMS complaining of right shoulder pain after being found on the kitchen floor at home by a neighbor at 11am. She suffered a mechanical fall around 2am and was unable to get up. She suffers from dementia and is not able to give a very coherent history. Friend at bedside says she fell a couple days ago and family noticed that she was incontinent of urine. Pt does report urinary frequency. In ER she was found to have a fracture of the distal right clavicle.         Review of Systems   Unable to perform ROS: Dementia        Past Medical History:   Diagnosis Date   • Anxiety 2016   • Aortic valve stenosis 2013   • Benign essential hypertension 2013   • Bicuspid aortic valve 2013   • Chronic obstructive pulmonary disease (CMS/HCC) 2016   • Hyperlipidemia 2016   • Lung cancer (CMS/HCC)      Past Surgical History:   Procedure Laterality Date   • AORTIC VALVE REPAIR/REPLACEMENT      History of bicuspid valve .  DR. RAMIRES, valve replacement with PIG valve.    • COLONOSCOPY      x1  does not remember date    • FOOT SURGERY     • LUNG BIOPSY      Lung biopsy by Dr. Church= lung cancer.  Treated with irradiation, no chemotherapy.     Family History   Problem Relation Age of Onset   • Cervical cancer Mother 80         in , at age 81.   • No Known Problems Father    • No Known Problems Sister    • No Known Problems Brother    • No Known Problems Daughter    • No Known Problems Son    • No Known Problems Maternal Grandmother    • No Known Problems Paternal Grandmother    • No Known Problems Maternal Aunt    • No Known Problems Paternal Aunt    • Breast cancer Other    • BRCA 1/2 Neg Hx    • Colon cancer Neg Hx    •  Endometrial cancer Neg Hx    • Ovarian cancer Neg Hx      Social History     Tobacco Use   • Smoking status: Former Smoker     Packs/day: 0.25     Years: 50.00     Pack years: 12.50     Types: Cigarettes     Last attempt to quit: 5/15/2018     Years since quittin.5   • Smokeless tobacco: Never Used   • Tobacco comment: Now has COPD and lung cancer.   Substance Use Topics   • Alcohol use: No   • Drug use: No     Medications Prior to Admission   Medication Sig Dispense Refill Last Dose   • aspirin 81 MG EC tablet Take 81 mg by mouth Daily.   Past Week at Unknown time   • enalapril (VASOTEC) 5 MG tablet Take 0.5 tablets by mouth Daily. 30 tablet 1 Past Week at Unknown time   • traZODone (DESYREL) 50 MG tablet Take 3 tablets by mouth Every Night. 15 tablet 1 2018 at Unknown time     Allergies:  Morphine sulfate and Penicillins    Objective      Vital Signs  Temp:  [97.9 °F (36.6 °C)-98.2 °F (36.8 °C)] 97.9 °F (36.6 °C)  Heart Rate:  [84-93] 84  Resp:  [16-20] 20  BP: (135-196)/(67-87) 135/85    Physical Exam   Constitutional: She appears well-developed and well-nourished. No distress.   HENT:   Head: Normocephalic and atraumatic.   Mouth/Throat: Oropharynx is clear and moist. No oropharyngeal exudate.   Eyes: Conjunctivae and EOM are normal. Right eye exhibits no discharge. Left eye exhibits no discharge. No scleral icterus.   Neck: Normal range of motion. Neck supple. No thyromegaly present.   Cardiovascular: Normal rate, regular rhythm, normal heart sounds and intact distal pulses.   Pulmonary/Chest: Effort normal and breath sounds normal. No respiratory distress.   Abdominal: Soft. Bowel sounds are normal. She exhibits no distension. There is no tenderness.   Musculoskeletal: She exhibits no edema or deformity.   TTP over anterior superior aspect of right shoulder   Lymphadenopathy:     She has no cervical adenopathy.   Neurological: She is alert. No cranial nerve deficit or sensory deficit.   Oriented to  person   Skin: Skin is warm and dry. Capillary refill takes 2 to 3 seconds. No rash noted. She is not diaphoretic. No erythema.   Psychiatric: She has a normal mood and affect. Her behavior is normal.   Nursing note and vitals reviewed.      Results Review:   I reviewed the patient's new clinical results.  I reviewed the patient's new imaging results and agree with the interpretation.  I reviewed the patient's other test results and agree with the interpretation  I personally viewed and interpreted the patient's EKG/Telemetry data  Discussed with patient, friend, and Dr. Padilla      Assessment/Plan       UTI (urinary tract infection)    Chronic obstructive pulmonary disease (CMS/HCC)    Malignant neoplasm of bronchus of upper lobe (CMS/HCC)    S/P aortic valve replacement with bioprosthetic valve    Traumatic rhabdomyolysis (CMS/HCC)    Fall    Closed nondisplaced fracture of acromial end of right clavicle    Leukocytosis    Dementia    HTN (hypertension)          Plan:   (Ortho consult, sling, analgesia  IVFs and monitor CK and Cr  Rocephin, urine culture sent  PT eval  Hold Trazodone   SCDs for DVT ppx  Confirmed DNR status  Further orders to follow as suggested by evolving hospital course).       I discussed the patients findings and my recommendations with patient, family and consulting provider    Froylan Strauss MD  11/18/18  4:18 PM    Time: 40min

## 2018-11-18 NOTE — CONSULTS
Inpatient Orthopedic Surgery Consult  Consult performed by: Froylan Hunt Jr., MD  Consult ordered by: Froylan Strauss MD          Patient Care Team:  Moiz Longo MD as PCP - General  Moiz Longo MD as PCP - Family Medicine  Linker, Eliot TANG III, MD as Surgeon (Thoracic Surgery)    Chief complaint: right shoulder pain      Subjective     History of Present Illness     The patient is a pleasant 85-year-old female with complex medical history including COPD, history of lung cancer, aortic valve stenosis, dementia who was brought to the emergency department after being found down after a mechanical fall early this morning.  She does have dementia and is a poor historian.  In the emergency room, radiographs confirmed a mildly displaced distal clavicle fracture on the right side.  Orthopedics was consulted for evaluation and management.    She localizes pain to the right clavicle/shoulder.  Pain can be a 5 out of 10.  It is worse with activity and better with rest.  It is sharp and aching.      Review of Systems   Review of Systems:  Constitutional: Unable to be obtained due to mental status  HENT: Unable to be obtained due to mental status  Eyes: Unable to be obtained due to mental status  Respiratory: Unable to be obtained due to mental status  Cardiovascular: Unable to be obtained due to mental status  Gastrointestinal: Unable to be obtained due to mental status  :Unable to be obtained due to mental status  Skin: Unable to be obtained due to mental status  Neurological: Unable to be obtained due to mental status  Hematological: Unable to be obtained due to mental status  Muscoloskeletal: Unable to be obtained due to mental status            Past Medical History:   Diagnosis Date   • Anxiety 6/14/2016   • Aortic valve stenosis 8/9/2013   • Benign essential hypertension 8/9/2013   • Bicuspid aortic valve 8/9/2013   • Chronic obstructive pulmonary disease (CMS/HCC) 6/14/2016   • Hyperlipidemia 6/14/2016   •  Lung cancer (CMS/HCC)    ,   Past Surgical History:   Procedure Laterality Date   • AORTIC VALVE REPAIR/REPLACEMENT      History of bicuspid valve .  DR. RAMIRES, valve replacement with PIG valve.    • COLONOSCOPY      x1  does not remember date    • FOOT SURGERY     • LUNG BIOPSY      Lung biopsy by Dr. Church= lung cancer.  Treated with irradiation, no chemotherapy.   ,   Family History   Problem Relation Age of Onset   • Cervical cancer Mother 80         in , at age 81.   • No Known Problems Father    • No Known Problems Sister    • No Known Problems Brother    • No Known Problems Daughter    • No Known Problems Son    • No Known Problems Maternal Grandmother    • No Known Problems Paternal Grandmother    • No Known Problems Maternal Aunt    • No Known Problems Paternal Aunt    • Breast cancer Other    • BRCA 1/2 Neg Hx    • Colon cancer Neg Hx    • Endometrial cancer Neg Hx    • Ovarian cancer Neg Hx    ,   Social History     Tobacco Use   • Smoking status: Former Smoker     Packs/day: 0.25     Years: 50.00     Pack years: 12.50     Types: Cigarettes     Last attempt to quit: 5/15/2018     Years since quittin.5   • Smokeless tobacco: Never Used   • Tobacco comment: Now has COPD and lung cancer.   Substance Use Topics   • Alcohol use: No   • Drug use: No   ,   Medications Prior to Admission   Medication Sig Dispense Refill Last Dose   • aspirin 81 MG EC tablet Take 81 mg by mouth Daily.   Past Week at Unknown time   • enalapril (VASOTEC) 5 MG tablet Take 0.5 tablets by mouth Daily. 30 tablet 1 Past Week at Unknown time   • traZODone (DESYREL) 50 MG tablet Take 3 tablets by mouth Every Night. 15 tablet 1 2018 at Unknown time   , Scheduled Meds:    acetaminophen 1,000 mg Oral Once   aspirin 81 mg Oral Daily   ceftriaxone 1 g Intravenous Q24H   sodium chloride 3 mL Intravenous Q12H   , Continuous Infusions:    sodium chloride 125 mL/hr Last Rate: 125 mL/hr (18 1641)   , PRN Meds:   acetaminophen  •  HYDROcodone-acetaminophen  •  ondansetron **OR** ondansetron ODT **OR** ondansetron  •  [COMPLETED] Insert peripheral IV **AND** sodium chloride  •  sodium chloride and Allergies:  Morphine sulfate and Penicillins    Objective      Vital Signs  Temp:  [97.9 °F (36.6 °C)-98.2 °F (36.8 °C)] 97.9 °F (36.6 °C)  Heart Rate:  [84-93] 84  Resp:  [16-20] 20  BP: (135-196)/(67-87) 135/85    Physical Exam  Physical Exam:  Vital signs reviewed.  Constitutional:  Appears quite frail  HEENT:  Head: normocephalic, atraumatic  Eyes: EOMI, grossly normal.  No scleral icterus.  Neck: Normal range of motion.  Supple, no tracheal deviation.  Cardiovascular: Regular rate.  Pulmonary: Effort normal, symmetric chest expansion, no labored breathing.  Abdominal: Soft, non distended  Neurological: No gross deficits, oriented to person, place, and time.  Skin: Warm and dry  Psychiatric: Normal mood and affect  Musculoskeletal:  Mild swelling without significant ecchymosis over the clavicle.  She is tender over the distal aspect of the clavicle.  She has no tenderness over the medial head of the clavicle.  No pain with passive range of motion of the shoulder.  She tolerates 90° of forward flexion without pain.  No tenderness around the elbow.  No pain with full supination or pronation.    Motor intact, AIN/PIN/r/m/u/    Patient intact to light touch in axillary patch, radial, median, ulnar distribution.  Fingers warm and well perfused.  Brisk capillary refill.  Palpable radial pulse.        Results Review:   Radiographs of the right shoulder and CT of the chest are independently reviewed.  These show a mildly displaced distal clavicle fracture without significant cephalad displacement.  Glenohumeral joint appears located.  There is some chronic appearing bony destruction at the medial aspect of the clavicle; radiology feels likely related to prior radiation or metastatic disease.           FINDINGS:  RIGHT CLAVICLE: 2 VIEWS.  There is an acute fracture of the distal  clavicle associated with mild inferior displacement. There is no clear  extension into the AC joint. No additional fracture or acute osseous  abnormality is evident.     AP AND LATERAL CHEST: The cardiomediastinal silhouette is not changed.  Sternotomy wires and atherosclerotic calcifications are present. There  is pulmonary arterial enlargement. Within the posterior right apex there  are 3 clips associated with dense pleural-based opacity that is better  demonstrated on recent CT and followed with CT.     IMPRESSION:  1. Acute mildly displaced fracture of the distal right clavicle without  evidence for intra-articular extension.  2. Three surgical clips in the right lung apex adjacent to dense  parenchymal opacity without evidence for change and correlated with CT  2018 though this is better followed with CT. There is no  convincing evidence for active disease in the chest.      CT SCAN OF THE CERVICAL SPINE     HISTORY: Fell. Neck pain.     FINDINGS: The CT scan was performed as an emergency procedure through  the cervical spine and demonstrates the followin. There is no evidence of acute fracture with particular reference to  the odontoid.  2. There are scattered degenerative changes throughout the cervical  spine including spurring of the lateral facets. This results in some  mild bilateral foraminal encroachment at C6-7.  3. The patient has history of lung cancer and there is some focal  irregular parenchymal and pleural change at the right apex laterally  which remains stable since the CT scan of the chest dated 2018 and  apparently related to post radiation changes as indicated on the PET  fusion CT scan of 2018.  4. There is a fracture and some associated mottled bony destruction  involving the medial head of the right clavicle. This may relate to post  radiation osteonecrosis and secondary fracture or possibly metastatic  disease. There was no  abnormal uptake in this region on the previous PET  fusion CT scan.           Assessment/Plan       UTI (urinary tract infection)    Chronic obstructive pulmonary disease (CMS/HCC)    Malignant neoplasm of bronchus of upper lobe (CMS/HCC)    S/P aortic valve replacement with bioprosthetic valve    Traumatic rhabdomyolysis (CMS/HCC)    Fall    Closed nondisplaced fracture of acromial end of right clavicle    Leukocytosis    Dementia    HTN (hypertension)    1.  Mildly displaced right distal clavicle fracture, closed    Assessment & Plan    The patient is a pleasant 85-year-old female with complex medical history including COPD, history of lung cancer, aortic valve stenosis, dementia who was found to have a mildly displaced right distal clavicle fracture following a mechanical fall.    We can manage this fracture nonoperatively in this frail elderly individual.    - Recommend sling for comfort  - Nonweightbearing, right upper extremity  - Pain control  - Management of lung cancer per oncology/medicine  - DVT: Jax/SCDs, chemoprophylaxis per primary team  - Dispo: discharge from orthopedic standpoint, can follow-up with me in the [facility] in 2-3 weeks, call [phone] for appointment    Thank you for this consultation.  Please call with questions.    Froylan Hunt Jr, MD  11/18/18  5:18 PM

## 2018-11-18 NOTE — ED NOTES
Pt to ed s/p found on floor this am by neighbor. Pt reports was getting up to make coffee at 0200 and fell. Pt unsure of how fall happened, daughter reports pt was found on floor in closet yesterday, states has been unsteady for last few days after starting on trazadone for sleep. Pt reports has not been sleeping well x 4 months, family reports concerned about safety, lives alone. Pt reports posterior neck stiffness, pain to R shoulder. Pt is able to move with slight amount of pain. Pt denies urinary complaints. + distal pulses present      Fallon Weathers, RN  11/18/18 1257

## 2018-11-18 NOTE — ED NOTES
Pt also has bruise present to anterior neck, daughter reports was not there yesterday, pt does not recall hitting anything during fall.      Fallon Weathers, RN  11/18/18 6920

## 2018-11-19 LAB
ALBUMIN SERPL-MCNC: 2.9 G/DL (ref 3.5–5.2)
ALBUMIN/GLOB SERPL: 0.8 G/DL
ALP SERPL-CCNC: 62 U/L (ref 39–117)
ALT SERPL W P-5'-P-CCNC: 19 U/L (ref 1–33)
ANION GAP SERPL CALCULATED.3IONS-SCNC: 12.4 MMOL/L
AST SERPL-CCNC: 32 U/L (ref 1–32)
BACTERIA UR QL AUTO: ABNORMAL /HPF
BASOPHILS # BLD AUTO: 0.03 10*3/MM3 (ref 0–0.2)
BASOPHILS NFR BLD AUTO: 0.3 % (ref 0–1.5)
BILIRUB SERPL-MCNC: 0.5 MG/DL (ref 0.1–1.2)
BILIRUB UR QL STRIP: NEGATIVE
BUN BLD-MCNC: 17 MG/DL (ref 8–23)
BUN/CREAT SERPL: 22.7 (ref 7–25)
CALCIUM SPEC-SCNC: 8.3 MG/DL (ref 8.6–10.5)
CHLORIDE SERPL-SCNC: 106 MMOL/L (ref 98–107)
CK SERPL-CCNC: 835 U/L (ref 20–180)
CLARITY UR: CLEAR
CO2 SERPL-SCNC: 20.6 MMOL/L (ref 22–29)
COLOR UR: YELLOW
CREAT BLD-MCNC: 0.75 MG/DL (ref 0.57–1)
DEPRECATED RDW RBC AUTO: 50 FL (ref 37–54)
EOSINOPHIL # BLD AUTO: 0.1 10*3/MM3 (ref 0–0.7)
EOSINOPHIL NFR BLD AUTO: 1.2 % (ref 0.3–6.2)
ERYTHROCYTE [DISTWIDTH] IN BLOOD BY AUTOMATED COUNT: 14 % (ref 11.7–13)
GFR SERPL CREATININE-BSD FRML MDRD: 73 ML/MIN/1.73
GLOBULIN UR ELPH-MCNC: 3.6 GM/DL
GLUCOSE BLD-MCNC: 108 MG/DL (ref 65–99)
GLUCOSE UR STRIP-MCNC: NEGATIVE MG/DL
HCT VFR BLD AUTO: 34.7 % (ref 35.6–45.5)
HGB BLD-MCNC: 10.8 G/DL (ref 11.9–15.5)
HGB UR QL STRIP.AUTO: ABNORMAL
HYALINE CASTS UR QL AUTO: ABNORMAL /LPF
IMM GRANULOCYTES # BLD: 0.02 10*3/MM3 (ref 0–0.03)
IMM GRANULOCYTES NFR BLD: 0.2 % (ref 0–0.5)
KETONES UR QL STRIP: ABNORMAL
LEUKOCYTE ESTERASE UR QL STRIP.AUTO: ABNORMAL
LYMPHOCYTES # BLD AUTO: 0.86 10*3/MM3 (ref 0.9–4.8)
LYMPHOCYTES NFR BLD AUTO: 9.9 % (ref 19.6–45.3)
MCH RBC QN AUTO: 30.3 PG (ref 26.9–32)
MCHC RBC AUTO-ENTMCNC: 31.1 G/DL (ref 32.4–36.3)
MCV RBC AUTO: 97.2 FL (ref 80.5–98.2)
MONOCYTES # BLD AUTO: 0.83 10*3/MM3 (ref 0.2–1.2)
MONOCYTES NFR BLD AUTO: 9.6 % (ref 5–12)
NEUTROPHILS # BLD AUTO: 6.81 10*3/MM3 (ref 1.9–8.1)
NEUTROPHILS NFR BLD AUTO: 78.8 % (ref 42.7–76)
NITRITE UR QL STRIP: NEGATIVE
PH UR STRIP.AUTO: 7 [PH] (ref 5–8)
PLATELET # BLD AUTO: 186 10*3/MM3 (ref 140–500)
PMV BLD AUTO: 10.2 FL (ref 6–12)
POTASSIUM BLD-SCNC: 3.5 MMOL/L (ref 3.5–5.2)
PROT SERPL-MCNC: 6.5 G/DL (ref 6–8.5)
PROT UR QL STRIP: NEGATIVE
RBC # BLD AUTO: 3.57 10*6/MM3 (ref 3.9–5.2)
RBC # UR: ABNORMAL /HPF
REF LAB TEST METHOD: ABNORMAL
SODIUM BLD-SCNC: 139 MMOL/L (ref 136–145)
SP GR UR STRIP: 1.01 (ref 1–1.03)
SQUAMOUS #/AREA URNS HPF: ABNORMAL /HPF
UROBILINOGEN UR QL STRIP: ABNORMAL
WBC NRBC COR # BLD: 8.65 10*3/MM3 (ref 4.5–10.7)
WBC UR QL AUTO: ABNORMAL /HPF

## 2018-11-19 PROCEDURE — 81001 URINALYSIS AUTO W/SCOPE: CPT | Performed by: HOSPITALIST

## 2018-11-19 PROCEDURE — 82550 ASSAY OF CK (CPK): CPT | Performed by: HOSPITALIST

## 2018-11-19 PROCEDURE — G8979 MOBILITY GOAL STATUS: HCPCS

## 2018-11-19 PROCEDURE — G0378 HOSPITAL OBSERVATION PER HR: HCPCS

## 2018-11-19 PROCEDURE — 80053 COMPREHEN METABOLIC PANEL: CPT | Performed by: HOSPITALIST

## 2018-11-19 PROCEDURE — 97110 THERAPEUTIC EXERCISES: CPT | Performed by: PHYSICAL THERAPIST

## 2018-11-19 PROCEDURE — 85025 COMPLETE CBC W/AUTO DIFF WBC: CPT | Performed by: HOSPITALIST

## 2018-11-19 PROCEDURE — G8978 MOBILITY CURRENT STATUS: HCPCS

## 2018-11-19 PROCEDURE — 97162 PT EVAL MOD COMPLEX 30 MIN: CPT | Performed by: PHYSICAL THERAPIST

## 2018-11-19 RX ADMIN — SODIUM CHLORIDE, PRESERVATIVE FREE 3 ML: 5 INJECTION INTRAVENOUS at 08:28

## 2018-11-19 RX ADMIN — SODIUM CHLORIDE 125 ML/HR: 9 INJECTION, SOLUTION INTRAVENOUS at 08:28

## 2018-11-19 RX ADMIN — ASPIRIN 81 MG: 81 TABLET, DELAYED RELEASE ORAL at 09:56

## 2018-11-19 RX ADMIN — SODIUM CHLORIDE 125 ML/HR: 9 INJECTION, SOLUTION INTRAVENOUS at 00:56

## 2018-11-19 RX ADMIN — SODIUM CHLORIDE, PRESERVATIVE FREE 3 ML: 5 INJECTION INTRAVENOUS at 21:12

## 2018-11-19 NOTE — PLAN OF CARE
Problem: Nutrition, Imbalanced: Inadequate Oral Intake (Adult)  Intervention: Promote/Optimize Nutrition   11/19/18 1537   Nutrition Interventions   Oral Nutrition Promotion calorie dense liquids provided;nutritional therapy counseling provided; MSA completed for moderate malnutrition; encouraged PO intake; added Boost Plus TID with meals to help promote kcal and protein intake

## 2018-11-19 NOTE — PROGRESS NOTES
Malnutrition Severity Assessment    Patient Name:  Karla Lambert  YOB: 1932  MRN: [id number]  Admit Date:  11/18/2018    Patient meets criteria for : Moderate malnutrition    Comments:  BMI 17.79.  Physical exam completed and detailed in chart below.    Malnutrition Type: Chronic Illness Malnutrition     Malnutrition Type (last 8 hours)      Malnutrition Severity Assessment     Row Name 11/19/18 1528       Malnutrition Severity Assessment    Malnutrition Type  Chronic Illness Malnutrition    Row Name 11/19/18 1528       Physical Signs of Malnutrition (Chronic)    Muscle Wasting  Severe mild-moderate patellar region; moderate clavicle region; severe acromion region, temporal region    Fat Loss  Severe moderate orbital region; severe triceps region    Secondary Physical Signs  Present (comment) coccyx pressure injury    Row Name 11/19/18 1528       Weight Status (Chronic)    BMI  Mild (<19)    Row Name 11/19/18 1528       Criteria Met (Must meet criteria for severity in at least 2 of these categories: M Wasting, Fat Loss, Fluid, Secondary Signs, Wt. Status, Intake)    Patient meets criteria for   Moderate malnutrition          Electronically signed by:  Debora Garland RD  11/19/18 3:43 PM

## 2018-11-19 NOTE — PROGRESS NOTES
Continued Stay Note  [facility]     Patient Name: Karla Lambert  MRN: [id number]  Today's Date: 11/19/2018    Admit Date: 11/18/2018    Discharge Plan     Row Name 11/19/18 1427       Plan    Plan  [facility] [place]     Plan Comments  Per [place]/[facility] has approved short term rehab pending Jair thomsonert. KIRT Macdonald    Row Name 11/19/18 2220       Plan    Plan  referral to [facility]; will need [place] precert     Patient/Family in Agreement with Plan  yes    Plan Comments  Checked IMM. Met with pt and pt's great niece Anila Plummer [phone] who is pt's healthcare surrogate, paperwork is on file. Confirmed facesheet correct. explained role of CCP. Pt lives alone in a first level condo. Pt was mostly IADLs, family assist as needed with shopping and things around the house but pt lived alone. Pt uses a cane to ambulate, does not have a walker at home. Pt has never used HH or been to SNF in past. Pt uses [facility] for prescriptions with no issues noted. Pt's PCP is Dr. Longo. Pt is agreeable to SNF at d/c, would like referral to [facility] spoke with [place] who will evaluate. Packet started in CCP office. CCP to follow….ANA LUISA Blanco        Discharge Codes    No documentation.             Mirella Garrison RN

## 2018-11-19 NOTE — PROGRESS NOTES
"DAILY PROGRESS NOTE  [facility]    Patient Identification:  Name: Karla Lambert  Age: 85 y.o.  Sex: female  :  1932  MRN: [id number]         Primary Care Physician: Moiz Longo MD      Subjective  No new c/o.     Objective:  General Appearance:  Comfortable, in no acute distress and not in pain (Thin, frail).    Vital signs: (most recent): Blood pressure 145/68, pulse 86, temperature 97.5 °F (36.4 °C), temperature source Oral, resp. rate 18, height 149.9 cm (59\"), weight 40 kg (88 lb 1.6 oz), SpO2 99 %.    Lungs:  Normal effort and normal respiratory rate.  Breath sounds clear to auscultation.    Heart: Normal rate.  Regular rhythm.    Extremities: There is no dependent edema.    Neurological: Patient is alert.  (Oriented to person and place. ).    Skin:  Warm and dry.                Vital signs in last 24 hours:  Temp:  [97 °F (36.1 °C)-98.2 °F (36.8 °C)] 97.5 °F (36.4 °C)  Heart Rate:  [81-88] 86  Resp:  [16-18] 18  BP: (117-145)/(65-72) 145/68    Intake/Output:    Intake/Output Summary (Last 24 hours) at 2018 1712  Last data filed at 2018 0828  Gross per 24 hour   Intake 3304 ml   Output --   Net 3304 ml         Results from last 7 days   Lab Units  18   0753  18   1211   WBC 10*3/mm3  8.65  12.51*   HEMOGLOBIN g/dL  10.8*  12.8   PLATELETS 10*3/mm3  186  220     Results from last 7 days   Lab Units  18   0752  18   1211   SODIUM mmol/L  139  139   POTASSIUM mmol/L  3.5  4.0   CHLORIDE mmol/L  106  100   CO2 mmol/L  20.6*  22.9   BUN mg/dL  17  25*   CREATININE mg/dL  0.75  0.93   GLUCOSE mg/dL  108*  125*   Estimated Creatinine Clearance: 32.5 mL/min (by C-G formula based on SCr of 0.75 mg/dL).  Results from last 7 days   Lab Units  18   0752  18   1211   CALCIUM mg/dL  8.3*  9.4   ALBUMIN g/dL  2.90*   --      Results from last 7 days   Lab Units  18   0752   ALBUMIN g/dL  2.90*   BILIRUBIN mg/dL  0.5   ALK PHOS U/L  62   AST " (SGOT) U/L  32   ALT (SGPT) U/L  19       Assessment:    Hematuria - Bacteruria - doubt UTI:  D/C antibiotic.  Recheck U/A.     Traumatic rhabdomyolysis    Fall    Closed nondisplaced fracture of acromial end of right clavicle    Leukocytosis - stress.     Chronic obstructive pulmonary disease    Malignant neoplasm of bronchus of upper lobe    S/P aortic valve replacement with bioprosthetic valve    Dementia    HTN (hypertension)       Plan:  Please see above.   Wean IVF, increase activity.  D/C planning.     Carlin Reynolds MD  11/19/2018  5:12 PM

## 2018-11-19 NOTE — PLAN OF CARE
Problem: Patient Care Overview  Goal: Plan of Care Review   11/19/18 1058   OTHER   Outcome Summary Pt admitted from home after fall with difficulty walking, c/o pain in R shoulder. Pt was seen by ortho and is to be NWB in sling on RUE. The current sling is too small and does not support the pt adequately in the proper positioning despite repostioning by PT. Rn will order a bigger one. Pt presents with genrealized weakness, decreased endurance, impaired balance and difficulty walking. She presents with R posterior lean in sitting and standing and is very unsteady despite assistance. Pt would benefit from PT to address these impairments and will need SNF placement at discharge.

## 2018-11-19 NOTE — CONSULTS
"Purpose of the visit was to evaluate for: goals of care/advanced care planning, support for patient/family, pain/symptom management and comfort care. Spoke with RN as well as patient and family and discussed palliative care, goals of care, care options, resuscitation status and discharge options.      Assessment:  Patient is palliative care appropriate for community based services with [facility] or SNF with palliative care. She recently fell at home and fractured her clavicle, she is not taking pain meds even though she states she is \"in terrible pain\". History of mild dementia, COPD, AVR, has fallen a few times in the past at home. Alert and oriented, answers questions appropriately.     Recommendations/Plan: Go to [facility] for Rehab. Anila, her power of  feels she may likely stay for long term care after Rehab due to falls and mild dementia. Anila will bring in power of  papers for the EMR.     Other Comments: Met with patient and her niece, Anila at bedside. Answered questions about Palliative Care, symptom management, discharge planning. They both feel like a skilled rehab will benefit her, but realize she may not go back home to live alone. She states she likes the [facility]. We will follow briefly as discharge plans are decided.     Total time: 45 minutes.   "

## 2018-11-19 NOTE — DISCHARGE PLACEMENT REQUEST
"Leonor Alegre SUMMER (85 y.o. Female)     Date of Birth Social Security Number Address Home Phone MRN    11/29/1932  [address] [address] [phone] [id number]    Protestant Marital Status          Jew        Admission Date Admission Type Admitting Provider Attending Provider Department, Room/Bed    11/18/18 Emergency Froylan Strauss MD Broaddus, Emmett J., MD [facility] [address], P397/1    Discharge Date Discharge Disposition Discharge Destination                       Attending Provider:  Carlin Reynolds MD    Allergies:  Morphine Sulfate, Penicillins    Isolation:  None   Infection:  None   Code Status:  No CPR    Ht:  149.9 cm (59\")   Wt:  40 kg (88 lb 1.6 oz)    Admission Cmt:  None   Principal Problem:  UTI (urinary tract infection) [N39.0]                 Active Insurance as of 11/18/2018     Primary Coverage     Payor Plan Insurance Group Employer/Plan Group    ANTHEM MEDICARE REPLACEMENT ANTHEM MEDICARE ADVANTAGE [id number]     Payor Plan Address Payor Plan Phone Number Payor Plan Fax Number Effective Dates    [address] [phone]  1/1/2016 - None Entered    [facility] [address]       Subscriber Name Subscriber Birth Date Member ID       LEONOR ALEGRE SUMMER 11/29/1932 [id number]                 Emergency Contacts      (Rel.) Home Phone Work Phone Mobile Phone    Anila Plummer (Power of ) [phone] -- [phone]    Tawanna Aparicio (Friend) -- -- [phone]            "

## 2018-11-19 NOTE — PLAN OF CARE
Problem: Skin Injury Risk (Adult)  Goal: Skin Health and Integrity  Outcome: Ongoing (interventions implemented as appropriate)      Problem: Fall Risk (Adult)  Goal: Absence of Fall  Outcome: Ongoing (interventions implemented as appropriate)      Problem: Patient Care Overview  Goal: Plan of Care Review  Outcome: Ongoing (interventions implemented as appropriate)   11/19/18 4479   Coping/Psychosocial   Plan of Care Reviewed With patient   Plan of Care Review   Progress no change   OTHER   Outcome Summary Pt c/o pain but refuses pain meds. Up with assist to chair. Ambulated with PT. [place] and brief in place. Looking at going to rehab when able to d/c. IVF and IV abx continued     Goal: Individualization and Mutuality  Outcome: Ongoing (interventions implemented as appropriate)    Goal: Discharge Needs Assessment  Outcome: Ongoing (interventions implemented as appropriate)    Goal: Interprofessional Rounds/Family Conf  Outcome: Ongoing (interventions implemented as appropriate)      Problem: Pain, Acute (Adult)  Goal: Acceptable Pain Control/Comfort Level  Outcome: Ongoing (interventions implemented as appropriate)      Problem: Nutrition, Imbalanced: Inadequate Oral Intake (Adult)  Goal: Identify Related Risk Factors and Signs and Symptoms  Outcome: Ongoing (interventions implemented as appropriate)    Goal: Improved Oral Intake  Outcome: Ongoing (interventions implemented as appropriate)    Goal: Prevent Further Weight Loss  Outcome: Ongoing (interventions implemented as appropriate)

## 2018-11-19 NOTE — PROGRESS NOTES
Discharge Planning Assessment  [facility]     Patient Name: Karla Lambert  MRN: [id number]  Today's Date: 11/19/2018    Admit Date: 11/18/2018    Discharge Needs Assessment     Row Name 11/19/18 1246       Living Environment    Lives With  alone    Current Living Arrangements  home/apartment/condo    Primary Care Provided by  self    Provides Primary Care For  no one    Family Caregiver if Needed  none       Resource/Environmental Concerns    Resource/Environmental Concerns  none       Transition Planning    Patient/Family Anticipates Transition to  inpatient rehabilitation facility    Transportation Anticipated  family or friend will provide       Discharge Needs Assessment    Readmission Within the Last 30 Days  no previous admission in last 30 days    Concerns to be Addressed  basic needs    Equipment Currently Used at Home  cane, straight    Equipment Needed After Discharge  none    Outpatient/Agency/Support Group Needs  skilled nursing facility    Discharge Facility/Level of Care Needs  nursing facility, skilled    Offered/Gave Vendor List  yes    Patient's Choice of Community Agency(s)  [facility]    Discharge Coordination/Progress  referral to [facility]/ to [place]        Discharge Plan     Row Name 11/19/18 1248       Plan    Plan  referral to [facility]; will need [place] precert     Patient/Family in Agreement with Plan  yes    Plan Comments  Checked IMM. Met with pt and pt's great shweta Plummer [phone] who is pt's healthcare surrogate, paperwork is on file. Confirmed facesheet correct. explained role of CCP. Pt lives alone in a first level condo. Pt was mostly IADLs, family assist as needed with shopping and things around the house but pt lived alone. Pt uses a cane to ambulate, does not have a walker at home. Pt has never used HH or been to SNF in past. Pt uses [facility] for prescriptions with no issues noted. Pt's PCP is Dr. Longo. Pt is agreeable  to SNF at d/c, would like referral to [facility] spoke with [place] who will evaluate. Packet started in CCP office. CCP to follow….ANA LUISA Blanco        Destination      Service Provider Request Status Selected Services Address Phone Number Fax Number    NATH[facility] Pending - Request Sent N/A [address] [facility] [address] [phone]      Durable Medical Equipment      No service coordination in this encounter.      Dialysis/Infusion      No service coordination in this encounter.      Home Medical Care      No service coordination in this encounter.      Community Resources      No service coordination in this encounter.          Demographic Summary     Row Name 11/19/18 1244       General Information    Admission Type  inpatient    Arrived From  home    Required Notices Provided  Important Message from Medicare    Reason for Consult  decision making    Preferred Language  English     Used During This Interaction  no       Contact Information    Permission Granted to Share Info With  facility ;family/designee        Functional Status     Row Name 11/19/18 1244       Functional Status    Usual Activity Tolerance  moderate    Current Activity Tolerance  fair       Functional Status, IADL    Medications  independent;assistive person    Meal Preparation  independent    Housekeeping  independent    Laundry  independent;assistive person    Shopping  assistive person       Mental Status    General Appearance WDL  WDL        Psychosocial    No documentation.       Abuse/Neglect    No documentation.       Legal    No documentation.       Substance Abuse    No documentation.       Patient Forms    No documentation.           Liset Cody

## 2018-11-19 NOTE — CONSULTS
Adult Nutrition  Assessment/PES    Patient Name:  Karla Lambert  YOB: 1932  MRN: [id number]  Admit Date:  11/18/2018    Assessment Date:  11/19/2018    Comments:  Consult d/t MST score of 5 per nurse admission screen, BMI 17.79, skin report.  Patient with coccyx pressure injury.  S/p fall, fractured clavicle.      Dementia, COPD, hx lung CA.  Patient reports good appetite, says she absolutely loved her lunch today.  Says it was great food.  She is unsure about weight loss.  Per chart weight history, weight appears to fluctuate within a few pounds frequently.  MSA completed for moderate malnutrition.    Encouraged PO intake.  Added Boost Plus TID, patient agreed to this.    RD to continue to follow.    Reason for Assessment     Row Name 11/19/18 1523          Reason for Assessment    Reason For Assessment  identified at risk by screening criteria;nurse/nurse practitioner consult     Diagnosis  neurologic conditions;psychosocial;pulmonary disease;cancer diagnosis/related complications dementia, anxiety, COPD, lung CA; adm with UTI and s/p fall with clavicle fracture     Identified At Risk by Screening Criteria  MST SCORE 2+;BMI;large or nonhealing wound, burn or pressure injury         Nutrition/Diet History     Row Name 11/19/18 1524          Nutrition/Diet History    Typical Food/Fluid Intake  patient reports good appetite, loved the spaghetti at lunch     Supplemental Drinks/Foods/Additives  agrees to Boost Plus         Anthropometrics     Row Name 11/19/18 1525          Admit Weight    Admit Weight  -- 88# 11/18        Usual Body Weight (UBW)    Weight Loss Time Frame  weight appears to fluctuate from 83-94# per chart weight report - this is looking back at weights from 2016        Body Mass Index (BMI)    BMI Assessment  BMI 17-18.4: protein-energy malnutrition grade I         Labs/Tests/Procedures/Meds     Row Name 11/19/18 1527          Labs/Procedures/Meds    Lab Results Reviewed  reviewed,  pertinent     Lab Results Comments  Gluc, Ca, Alb, Hgb, Hct        Diagnostic Tests/Procedures    Diagnostic Test/Procedure Reviewed  reviewed, pertinent        Medications    Pertinent Medications Reviewed  reviewed, pertinent         Physical Findings     Row Name 11/19/18 1527          Physical Findings    Overall Physical Appearance  underweight;loss of subcutaneous fat;loss of muscle mass     Skin  pressure injury;other (see comments) coccyx PI, L elbow skin tear, B=16         Estimated/Assessed Needs     Row Name 11/19/18 1527          Calculation Measurements    Weight Used For Calculations  40 kg (88 lb 2.9 oz)        Estimated/Assessed Needs    Additional Documentation  KCAL/KG (Group);Protein Requirements (Group);Fluid Requirements (Group)        KCAL/KG    14 Kcal/Kg (kcal)  560     15 Kcal/Kg (kcal)  600     18 Kcal/Kg (kcal)  720     20 Kcal/Kg (kcal)  800     25 Kcal/Kg (kcal)  1000     30 Kcal/Kg (kcal)  1200     35 Kcal/Kg (kcal)  1400     40 Kcal/Kg (kcal)  1600     45 Kcal/Kg (kcal)  1800     50 Kcal/Kg (kcal)  2000     kcal/kg (Specify)  -- 1400        [place]-St. Jeor Equation    RMR ([place]-St. Jeor Equation)  750.63        Protein Requirements    Est Protein Requirement Amount (gms/kg)  1.5 gm protein     Estimated Protein Requirements (gms/day)  60        Fluid Requirements    Estimated Fluid Requirements (mL/day)  1200     Estimated Fluid Requirement Method  RDA Method     RDA Method (mL)  1200     [place]-Segar Method (over 20 kg)  2300         Nutrition Prescription Ordered     Row Name 11/19/18 1528          Nutrition Prescription PO    Current PO Diet  Regular         Evaluation of Received Nutrient/Fluid Intake     Row Name 11/19/18 1528 11/19/18 1527       Calculation Measurements    Weight Used For Calculations  --  40 kg (88 lb 2.9 oz)       PO Evaluation    Number of Days PO Intake Evaluated  Insufficient Data  --        Evaluation of Prescribed Nutrient/Fluid Intake     Row Name  11/19/18 1527          Calculation Measurements    Weight Used For Calculations  40 kg (88 lb 2.9 oz)         Malnutrition Severity Assessment     Row Name 11/19/18 1528          Malnutrition Severity Assessment    Malnutrition Type  Chronic Illness Malnutrition        Physical Signs of Malnutrition (Chronic)    Muscle Wasting  Severe mild-moderate patellar region; moderate clavicle region; severe acromion region, temporal region     Fat Loss  Severe moderate orbital region; severe triceps region     Secondary Physical Signs  Present (comment) coccyx pressure injury        Weight Status (Chronic)    BMI  Mild (<19)        Criteria Met (Must meet criteria for severity in at least 2 of these categories: M Wasting, Fat Loss, Fluid, Secondary Signs, Wt. Status, Intake)    Patient meets criteria for   Moderate malnutrition           Problem/Interventions:  Problem 1     Row Name 11/19/18 1534          Nutrition Diagnoses Problem 1    Problem 1  Malnutrition     Etiology (related to)  Medical Diagnosis     Neurological  Dementia     Oncology  Lung cancer     Pulmonary/Critical Care  COPD     Signs/Symptoms (evidenced by)  BMI;Other (comment);Report/Observation physical exam     BMI  17 - 17.9         Problem 2     Row Name 11/19/18 1537          Nutrition Diagnoses Problem 2    Problem 2  Increased Nutrient Needs     Macronutrient  Kcal;Protein     Etiology (related to)  Medical Diagnosis     Pulmonary/Critical Care  COPD     Skin  Pressure injury     Signs/Symptoms (evidenced by)  Report/Observation               Intervention Goal     Row Name 11/19/18 1536          Intervention Goal    General  Maintain nutrition;Reduce/improve symptoms;Disease management/therapy;Meet nutritional needs for age/condition     PO  Establish PO;PO intake (%)     PO Intake %  80 %     Weight  Appropriate weight gain         Nutrition Intervention     Row Name 11/19/18 1536          Nutrition Intervention    RD/Tech Action  Care plan  reviewd;Follow Tx progress;Encourage intake;Recommend/ordered     Recommended/Ordered  Supplement         Nutrition Prescription     Row Name 11/19/18 1536          Nutrition Prescription PO    PO Prescription  Begin/change supplement     Supplement  Boost Plus     Supplement Frequency  3 times a day     New PO Prescription Ordered?  Yes         Education/Evaluation     Row Name 11/19/18 1536          Education    Education  Will Instruct as appropriate        Monitor/Evaluation    Monitor  Per protocol;PO intake;Supplement intake;Pertinent labs;Weight;Skin status;Symptoms     Education Follow-up  Reinforce PRN           Electronically signed by:  Debora Garland RD  11/19/18 3:38 PM

## 2018-11-19 NOTE — THERAPY EVALUATION
Acute Care - Physical Therapy Initial Evaluation  [facility]     Patient Name: Karla Lambert  : 1932  MRN: [id number]  Today's Date: 2018                Admit Date: 2018    Visit Dx:     ICD-10-CM ICD-9-CM   1. Traumatic rhabdomyolysis, initial encounter (CMS/[facility]) T79.6XXA 958.6   2. Closed nondisplaced fracture of right clavicle, unspecified part of clavicle, initial encounter S42.001A 810.00   3. Difficulty walking R26.2 719.7     Patient Active Problem List   Diagnosis   • Anxiety   • Chronic obstructive pulmonary disease (CMS/[facility])   • Hyperlipidemia   • Malignant neoplasm of bronchus of upper lobe (CMS/[facility])   • Abnormal loss of weight   • S/P aortic valve replacement with bioprosthetic valve   • Routine health maintenance   • Mastalgia in female   • Lung nodule   • Traumatic rhabdomyolysis (CMS/[facility])   • Fall   • Closed nondisplaced fracture of acromial end of right clavicle   • UTI (urinary tract infection)   • Leukocytosis   • Dementia   • HTN (hypertension)     Past Medical History:   Diagnosis Date   • Anxiety 2016   • Aortic valve stenosis 2013   • Benign essential hypertension 2013   • Bicuspid aortic valve 2013   • Chronic obstructive pulmonary disease (CMS/HCC) 2016   • Hyperlipidemia 2016   • Lung cancer (CMS/[facility])      Past Surgical History:   Procedure Laterality Date   • AORTIC VALVE REPAIR/REPLACEMENT      History of bicuspid valve .  DR. RAMIRES, valve replacement with PIG valve.    • COLONOSCOPY      x1  does not remember date    • FOOT SURGERY     • LUNG BIOPSY      Lung biopsy by Dr. Church= lung cancer.  Treated with irradiation, no chemotherapy.        PT ASSESSMENT (last 12 hours)      Physical Therapy Evaluation     Row Name 18 1045          PT Evaluation Time/Intention    Subjective Information  complains of;pain  -KH     Document Type  evaluation  -KH     Mode of Treatment  physical therapy  -KH     Patient Effort  good   -     Symptoms Noted During/After Treatment  increased pain  -     Row Name 11/19/18 1045          General Information    Patient Observations  alert;cooperative;agree to therapy  -     General Observations of Patient  in bedm niece in the room  -     Prior Level of Function  independent: but declined just recently with falls  -     Equipment Currently Used at Home  cane, straight  -     Pertinent History of Current Functional Problem  UTI, R clavicle fx, recent falls  -     Existing Precautions/Restrictions  fall  -     Row Name 11/19/18 1045          Relationship/Environment    Lives With  alone  -     Row Name 11/19/18 1045          Resource/Environmental Concerns    Current Living Arrangements  home/apartment/condo  -     Row Name 11/19/18 1045          Cognitive Assessment/Interventions    Additional Documentation  Cognitive Assessment/Intervention (Group)  -     Row Name 11/19/18 1045          Cognitive Assessment/Intervention- PT/OT    Orientation Status (Cognition)  oriented to;person;place  -     Follows Commands (Cognition)  follows one step commands;75-90% accuracy  -     Personal Safety Interventions  gait belt;fall prevention program maintained;nonskid shoes/slippers when out of bed  -     Row Name 11/19/18 1045          Bed Mobility Assessment/Treatment    Bed Mobility Assessment/Treatment  supine-sit;sit-supine  -     Supine-Sit [place] (Bed Mobility)  moderate assist (50% patient effort)  -     Assistive Device (Bed Mobility)  bed rails;draw sheet  -     Row Name 11/19/18 1045          Transfer Assessment/Treatment    Transfer Assessment/Treatment  sit-stand transfer;stand-sit transfer  -     Comment (Transfers)  mod A from bed, min A from chair  -     Sit-Stand [place] (Transfers)  minimum assist (75% patient effort);moderate assist (50% patient effort)  -     Stand-Sit [place] (Transfers)  minimum assist (75% patient effort)  -     Row Name  11/19/18 1045          Sit-Stand Transfer    Assistive Device (Sit-Stand Transfers)  -- HHA'  -[facility] Name 11/19/18 1045          Stand-Sit Transfer    Assistive Device (Stand-Sit Transfers)  -- HHA  -[facility] Name 11/19/18 1045          Gait/Stairs Assessment/Training    [place] Level (Gait)  moderate assist (50% patient effort)  -     Assistive Device (Gait)  -- HHA  -     Distance in Feet (Gait)  10  -KH     Pattern (Gait)  step-to  -     Deviations/Abnormal Patterns (Gait)  antalgic;base of support, narrow;sonal decreased;gait speed decreased;stride length decreased  -     Bilateral Gait Deviations  leans left posterior lean also  -[facility] Name 11/19/18 1045          General ROM    GENERAL ROM COMMENTS  WFL except RUE in sling.  -[facility] Name 11/19/18 1045          MMT (Manual Muscle Testing)    General MMT Comments  WFL  -[facility] 11/19/18 1045          Motor Assessment/Intervention    Additional Documentation  Therapeutic Exercise Interventions (Group);Therapeutic Exercise (Group);Balance (Group)  -[facility] Name 11/19/18 1045          Therapeutic Exercise    Comment (Therapeutic Exercise)  BLE AP, ALW, seated marching,  -[facility] Name 11/19/18 1045          Balance    Balance  static sitting balance;static standing balance  -KH     Row Name 11/19/18 1045          Static Sitting Balance    Level of [place] (Unsupported Sitting, Static Balance)  minimal assist, 75% patient effort  -KH     Sitting Position (Unsupported Sitting, Static Balance)  sitting on edge of bed  -     Time Able to Maintain Position (Unsupported Sitting, Static Balance)  45 to 60 seconds  -     Comment (Unsupported Sitting, Static Balance)  Frequent LOB R and posterior. Pt fatigues quickly.   -     Row Name 11/19/18 1045          Static Standing Balance    Level of [place] (Supported Standing, Static Balance)  moderate assist, 50 to 74% patient effort  -     Time Able to Maintain  Position (Supported Standing, Static Balance)  15 to 30 seconds  -     Assistive Device Utilized (Supported Standing, Static Balance)  -- HHA  -     Comment (Supported Standing, Static Balance)  Strong R posterior lean  -MEREDITH     Row Name 11/19/18 1045          Pain Assessment    Additional Documentation  Pain Scale: Word Pre/Post-Treatment (Group)  -MEREDITH     Row Name 11/19/18 1045          Pain Scale: Numbers Pre/Post-Treatment    Pain Location - Side  Right  -KH     Pain Location  shoulder  -     Pain Intervention(s)  Repositioned  -MEREDITH     Row Name 11/19/18 1045          Pain Scale: Word Pre/Post-Treatment    Pain: Word Scale, Pretreatment  2 - mild pain  -KH     Pain: Word Scale, Post-Treatment  6 - moderate-severe pain  -MEREDITH     Row Name             Wound 11/18/18 1520 Left coccyx pressure injury    Wound - Properties Group Date first assessed: 11/18/18  -BK Time first assessed: 1520  -BK Present On Admission : yes;picture taken  -BK Side: Left  -BK Location: coccyx  -BK Type: pressure injury  -BK    Row Name             Wound 11/18/18 1520 Left posterior elbow skin tear    Wound - Properties Group Date first assessed: 11/18/18  -BK Time first assessed: 1520  -BK Side: Left  -BK Orientation: posterior  -BK Location: elbow  -BK Type: skin tear  -BK    Row Name 11/19/18 1045          Plan of Care Review    Plan of Care Reviewed With  patient niece  -MEREDITH     Row Name 11/19/18 1045          Physical Therapy Clinical Impression    Patient/Family Goals Statement (PT Clinical Impression)  returnt o PLOF at home, per pt. per nikamran, pt will need rehab  -     Criteria for Skilled Interventions Met (PT Clinical Impression)  treatment indicated  -     Impairments Found (describe specific impairments)  gait, locomotion, and balance;ROM;aerobic capacity/endurance  -     Rehab Potential (PT Clinical Summary)  good, to achieve stated therapy goals  -     Row Name 11/19/18 1045          Physical Therapy Goals    Bed  Mobility Goal Selection (PT)  bed mobility, PT goal 1  -     Transfer Goal Selection (PT)  transfer, PT goal 1  -     Gait Training Goal Selection (PT)  gait training, PT goal 1  -     Row Name 11/19/18 1045          Bed Mobility Goal 1 (PT)    Activity/Assistive Device (Bed Mobility Goal 1, PT)  bed mobility activities, all  -     [place] Level/Cues Needed (Bed Mobility Goal 1, PT)  minimum assist (75% or more patient effort)  -     Time Frame (Bed Mobility Goal 1, PT)  1 week  -     Row Name 11/19/18 1045          Transfer Goal 1 (PT)    Activity/Assistive Device (Transfer Goal 1, PT)  transfers, all;cane, straight  -     [place] Level/Cues Needed (Transfer Goal 1, PT)  contact guard assist  -     Time Frame (Transfer Goal 1, PT)  1 week  -     Row Name 11/19/18 1045          Gait Training Goal 1 (PT)    Activity/Assistive Device (Gait Training Goal 1, PT)  gait (walking locomotion);cane, straight  -     [place] Level (Gait Training Goal 1, PT)  minimum assist (75% or more patient effort)  -     Distance (Gait Goal 1, PT)  100 ft  -     Time Frame (Gait Training Goal 1, PT)  1 week  -     Row Name 11/19/18 1045          Patient Education Goal (PT)    Activity (Patient Education Goal, PT)  HEP, use of sling   -     [place]/Cues/Accuracy (Memory Goal 2, PT)  demonstrates adequately  -     Time Frame (Patient Education Goal, PT)  1 week  -     Row Name 11/19/18 1045          Positioning and Restraints    Pre-Treatment Position  in bed  -     Post Treatment Position  chair  -     In Chair  reclined;call light within reach;encouraged to call for assist;with family/caregiver;with nsg CNA in room  -       User Key  (r) = Recorded By, (t) = Taken By, (c) = Cosigned By    Initials Name Provider Type    Sunshine Taylor, PT Physical Therapist    Antonia Montilla, RN Registered Nurse        Physical Therapy Education     Title: PT OT SLP Therapies (Done)      Topic: Physical Therapy (Done)     Point: Mobility training (Done)     Learning Progress Summary           Patient Acceptance, E,D, VU,NR by  at 11/19/2018 10:53 AM                   Point: Home exercise program (Done)     Learning Progress Summary           Patient Acceptance, E,D, VU,NR by  at 11/19/2018 10:53 AM                   Point: Body mechanics (Done)     Learning Progress Summary           Patient Acceptance, E,D, VU,NR by  at 11/19/2018 10:53 AM                   Point: Precautions (Done)     Learning Progress Summary           Patient Acceptance, E,D, VU,NR by  at 11/19/2018 10:53 AM                               User Key     Initials Effective Dates Name Provider Type Discipline     06/08/18 -  Sunshine Sigala, PT Physical Therapist PT              PT Recommendation and Plan  Anticipated Discharge Disposition (PT): skilled nursing facility  Planned Therapy Interventions (PT Eval): balance training, bed mobility training, gait training, home exercise program, patient/family education, ROM (range of motion), strengthening, transfer training  Therapy Frequency (PT Clinical Impression): daily  Outcome Summary/Treatment Plan (PT)  Anticipated Discharge Disposition (PT): skilled nursing facility  Plan of Care Reviewed With: patient(shweta)  Outcome Summary: Pt admitted from home after fall with difficulty walking, c/o pain in R shoulder. Pt was seen by ortho and is to be NWB in sling on RUE. The current sling is too small and does not support the pt adequately in the proper positioning despite repostioning by PT. Rn will order a bigger one. Pt presents with genrealized weakness, decreased endurance, impaired balance and difficulty walking. She presents with R posterior lean in sitting and standing and is very unsteady despite assistance. Pt would benefit from PT to address these impairments and will need SNF placement at discharge.   Outcome Measures     Row Name 11/19/18 1058              How much help from another person do you currently need...    Turning from your back to your side while in flat bed without using bedrails?  3  -KH      Moving from lying on back to sitting on the side of a flat bed without bedrails?  2  -KH      Moving to and from a bed to a chair (including a wheelchair)?  2  -KH      Standing up from a chair using your arms (e.g., wheelchair, bedside chair)?  3  -KH      Climbing 3-5 steps with a railing?  1  -KH      To walk in hospital room?  2  -KH      AM-PAC 6 Clicks Score  13  -KH         Functional Assessment    Outcome Measure Options  AM-PAC 6 Clicks Basic Mobility (PT)  -        User Key  (r) = Recorded By, (t) = Taken By, (c) = Cosigned By    Initials Name Provider Type    Sunshine Taylor, PT Physical Therapist         Time Calculation:   PT Charges     Row Name 11/19/18 1044             Time Calculation    Start Time  1020  -      Stop Time  1045  -      Time Calculation (min)  25 min  -      PT Received On  11/19/18  -      PT - Next Appointment  11/20/18  -MEREDITH      PT Goal Re-Cert Due Date  11/26/18  -         Time Calculation- PT    Total Timed Code Minutes- PT  8 minute(s)  -        User Key  (r) = Recorded By, (t) = Taken By, (c) = Cosigned By    Initials Name Provider Type    Sunshine Taylor, PT Physical Therapist        Therapy Suggested Charges     Code   Minutes Charges    None           Therapy Charges for Today     Code Description Service Date Service Provider Modifiers Qty    37897950906  PT EVAL MOD COMPLEXITY 2 11/19/2018 Sunshine Sigala, PT GP 1    [id number]  PT THER PROC EA 15 MIN 11/19/2018 Sunshine Sigala, PT GP 1          PT G-Codes  Outcome Measure Options: AM-PAC 6 Clicks Basic Mobility (PT)  AM-PAC 6 Clicks Score: 13      Snushine Sigala, PT  11/19/2018

## 2018-11-20 VITALS
BODY MASS INDEX: 17.76 KG/M2 | DIASTOLIC BLOOD PRESSURE: 74 MMHG | TEMPERATURE: 98.3 F | OXYGEN SATURATION: 93 % | HEART RATE: 79 BPM | HEIGHT: 59 IN | WEIGHT: 88.1 LBS | SYSTOLIC BLOOD PRESSURE: 145 MMHG | RESPIRATION RATE: 16 BRPM

## 2018-11-20 LAB
ANION GAP SERPL CALCULATED.3IONS-SCNC: 14.5 MMOL/L
BACTERIA SPEC AEROBE CULT: NO GROWTH
BASOPHILS # BLD AUTO: 0.03 10*3/MM3 (ref 0–0.2)
BASOPHILS NFR BLD AUTO: 0.4 % (ref 0–1.5)
BUN BLD-MCNC: 10 MG/DL (ref 8–23)
BUN/CREAT SERPL: 18.5 (ref 7–25)
CALCIUM SPEC-SCNC: 8.3 MG/DL (ref 8.6–10.5)
CHLORIDE SERPL-SCNC: 102 MMOL/L (ref 98–107)
CK SERPL-CCNC: 453 U/L (ref 20–180)
CO2 SERPL-SCNC: 21.5 MMOL/L (ref 22–29)
CREAT BLD-MCNC: 0.54 MG/DL (ref 0.57–1)
DEPRECATED RDW RBC AUTO: 50.2 FL (ref 37–54)
EOSINOPHIL # BLD AUTO: 0.15 10*3/MM3 (ref 0–0.7)
EOSINOPHIL NFR BLD AUTO: 1.8 % (ref 0.3–6.2)
ERYTHROCYTE [DISTWIDTH] IN BLOOD BY AUTOMATED COUNT: 14 % (ref 11.7–13)
GFR SERPL CREATININE-BSD FRML MDRD: 107 ML/MIN/1.73
GLUCOSE BLD-MCNC: 94 MG/DL (ref 65–99)
HCT VFR BLD AUTO: 36.1 % (ref 35.6–45.5)
HGB BLD-MCNC: 11.1 G/DL (ref 11.9–15.5)
IMM GRANULOCYTES # BLD: 0.03 10*3/MM3 (ref 0–0.03)
IMM GRANULOCYTES NFR BLD: 0.4 % (ref 0–0.5)
LYMPHOCYTES # BLD AUTO: 0.96 10*3/MM3 (ref 0.9–4.8)
LYMPHOCYTES NFR BLD AUTO: 11.7 % (ref 19.6–45.3)
MCH RBC QN AUTO: 29.9 PG (ref 26.9–32)
MCHC RBC AUTO-ENTMCNC: 30.7 G/DL (ref 32.4–36.3)
MCV RBC AUTO: 97.3 FL (ref 80.5–98.2)
MONOCYTES # BLD AUTO: 0.88 10*3/MM3 (ref 0.2–1.2)
MONOCYTES NFR BLD AUTO: 10.7 % (ref 5–12)
NEUTROPHILS # BLD AUTO: 6.19 10*3/MM3 (ref 1.9–8.1)
NEUTROPHILS NFR BLD AUTO: 75 % (ref 42.7–76)
PLATELET # BLD AUTO: 190 10*3/MM3 (ref 140–500)
PMV BLD AUTO: 10.3 FL (ref 6–12)
POTASSIUM BLD-SCNC: 3 MMOL/L (ref 3.5–5.2)
RBC # BLD AUTO: 3.71 10*6/MM3 (ref 3.9–5.2)
SODIUM BLD-SCNC: 138 MMOL/L (ref 136–145)
WBC NRBC COR # BLD: 8.24 10*3/MM3 (ref 4.5–10.7)

## 2018-11-20 PROCEDURE — G0378 HOSPITAL OBSERVATION PER HR: HCPCS

## 2018-11-20 PROCEDURE — 82550 ASSAY OF CK (CPK): CPT | Performed by: HOSPITALIST

## 2018-11-20 PROCEDURE — 85025 COMPLETE CBC W/AUTO DIFF WBC: CPT | Performed by: HOSPITALIST

## 2018-11-20 PROCEDURE — 80048 BASIC METABOLIC PNL TOTAL CA: CPT | Performed by: HOSPITALIST

## 2018-11-20 RX ORDER — HYDROCODONE BITARTRATE AND ACETAMINOPHEN 5; 325 MG/1; MG/1
1 TABLET ORAL EVERY 4 HOURS PRN
Qty: 18 TABLET | Refills: 0 | Status: SHIPPED | OUTPATIENT
Start: 2018-11-20 | End: 2018-11-28

## 2018-11-20 RX ORDER — POTASSIUM CHLORIDE 1.5 G/1.77G
40 POWDER, FOR SOLUTION ORAL ONCE
Status: COMPLETED | OUTPATIENT
Start: 2018-11-20 | End: 2018-11-20

## 2018-11-20 RX ADMIN — ACETAMINOPHEN 650 MG: 325 TABLET, FILM COATED ORAL at 08:17

## 2018-11-20 RX ADMIN — POTASSIUM CHLORIDE 40 MEQ: 1.5 POWDER, FOR SOLUTION ORAL at 13:45

## 2018-11-20 RX ADMIN — SODIUM CHLORIDE 50 ML/HR: 9 INJECTION, SOLUTION INTRAVENOUS at 01:01

## 2018-11-20 RX ADMIN — POTASSIUM CHLORIDE 40 MEQ: 1.5 POWDER, FOR SOLUTION ORAL at 09:15

## 2018-11-20 RX ADMIN — SODIUM CHLORIDE, PRESERVATIVE FREE 3 ML: 5 INJECTION INTRAVENOUS at 08:25

## 2018-11-20 RX ADMIN — ASPIRIN 81 MG: 81 TABLET, DELAYED RELEASE ORAL at 08:16

## 2018-11-20 NOTE — DISCHARGE SUMMARY
PHYSICIAN DISCHARGE SUMMARY                                                                        [facility]    Patient Identification:  Name: Karla Lambert  Age: 85 y.o.  Sex: female  :  1932  MRN: [id number]  Primary Care Physician: Moiz Longo MD    Admit date: 2018  Discharge date and time: 2018     Discharged Condition: good    Discharge Diagnoses:    Traumatic rhabdomyolysis    Fall    Closed nondisplaced fracture of acromial end of right clavicle    Leukocytosis - stress.     Chronic obstructive pulmonary disease    Malignant neoplasm of bronchus of upper lobe    S/P aortic valve replacement with bioprosthetic valve    Dementia    HTN (hypertension)     Hematuria - Bacteruria     Hypokalemia:  Replace orally.      Hospital Course:  Pleasant 85-year-old female presents after being found down at 11 AM.  She had fallen at approximately 2 AM.  She complained of right shoulder pain and workup revealed a distal right clavicular fracture.  Please see H&P for full details.  In addition she has significant elevation in her CK levels.  There appears to been initial concern of urinary tract infection but the urinalysis shows primarily hematuria and some mild bacteriuria.  Antibiotics discontinued and urinalysis repeated.  Hematuria appears to have improved already.  The patient was asymptomatic.  Orthopedic consultation was obtained and the right shoulder and clavicular area where braced.  CK levels have been falling nicely with IV fluids and today they're within arranged with IV fluids can be discontinued.  Potassium did fall with the IV fluid therapy and she will receive 2 doses of oral potassium prior to discharge this afternoon.  Plans are to discharge to rehabilitation at this point.  She is a very frail individual and her dementia is certainly not going to be improving so she'll probably need long-term  "care or at least 24-hour supervision in the very near future.    Consults:     Consults     Date and Time Order Name Status Description    11/18/2018 1632 Inpatient Orthopedic Surgery Consult Completed     11/18/2018 1249 Ortho (on-call MD unless specified) Completed     11/18/2018 1249 [facility]HA (on-call MD unless specified) Completed             Discharge Exam:  Physical Exam   General Appearance:  Comfortable, in no acute distress and not in pain (Thin, frail).    Vital signs: (most recent): Blood pressure 145/74, pulse 79, temperature 98.3 °F (36.8 °C), temperature source Oral, resp. rate 16, height 149.9 cm (59\"), weight 40 kg (88 lb 1.6 oz), SpO2 93 %.    Lungs:  Normal effort and normal respiratory rate.  Breath sounds clear to auscultation.    Heart: Normal rate.  Regular rhythm.    Extremities: There is no dependent edema.    Neurological: Patient is alert.    Skin:  Warm and dry.           Disposition:  Rehab    Patient Instructions:      Discharge Medications      New Medications      Instructions Start Date   HYDROcodone-acetaminophen 5-325 MG per tablet  Commonly known as:  NORCO   1 tablet, Oral, Every 4 Hours PRN         Continue These Medications      Instructions Start Date   aspirin 81 MG EC tablet   81 mg, Oral, Daily      traZODone 50 MG tablet  Commonly known as:  DESYREL   150 mg, Oral, Nightly      VASOTEC 5 MG tablet  Generic drug:  enalapril   2.5 mg, Oral, Daily           Diet Instructions     Diet: Regular      Discharge Diet:  Regular    Boost w meals.        Future Appointments   Date Time Provider Department Center   3/6/2019 11:30 AM [facility] CT 2  [facility]   3/13/2019  8:45 AM [facility]ain III[facility]     Additional Instructions for the Follow-ups that You Need to Schedule     Discharge Follow-up with PCP   As directed       Currently Documented PCP:    Moiz Lonog MD    PCP Phone Number:    [phone]     Follow Up Details:  1 week         Basic Metabolic Panel    Nov " 22, 2018 (Approximate)      CK    Nov 22, 2018 (Approximate)        Follow-up Information     Moiz Longo MD .    Specialty:  Internal Medicine  Why:  1 week  Contact information:  [address]  [place] [id number] [phone]                 Discharge Order (From admission, onward)    Start     Ordered    11/20/18 1302  Discharge patient  Once     Comments:  D/C after 2nd dose of KCL   Expected Discharge Date:  11/20/18    Discharge Disposition:  Rehab Facility or Unit (DC - External)    Physician of Record for Attribution - Please select from Treatment Team:  CARLIN REYNOLDS [[id number]]    Review needed by CMO to determine Physician of Record:  No       Question Answer Comment   Physician of Record for Attribution - Please select from Treatment Team CARLIN REYNOLDS    Review needed by CMO to determine Physician of Record No        11/20/18 1305            Total time spent discharging patient including evaluation,post hospitalization follow up,  medication and post hospitalization instructions and education total time exceeds 30 minutes.    Signed:  Carlin Reynolds MD  11/20/2018  1:06 PM    EMR Dragon/Transcription disclaimer:   Much of this encounter note is an electronic transcription/translation of spoken language to printed text. The electronic translation of spoken language may permit erroneous, or at times, nonsensical words or phrases to be inadvertently transcribed; Although I have reviewed the note for such errors, some may still exist.

## 2018-11-20 NOTE — PROGRESS NOTES
Continued Stay Note  [facility]     Patient Name: Karla Lambert  MRN: [id number]  Today's Date: 11/20/2018    Admit Date: 11/18/2018    Discharge Plan     Row Name 11/20/18 0959       Plan    Plan Comments  [facility] contacted [place] to notify of [facility] discharge plan; POA (Anila Plummer [phone]) at bedside agreed to short term rehab placement at [facility] pending Jair braswell. KIRT Macdonlad    Row Name 11/20/18 0956       [facility]    [facility]         Discharge Codes    No documentation.             Mirella Garrison RN

## 2018-11-20 NOTE — PROGRESS NOTES
"DAILY PROGRESS NOTE  [facility]    Patient Identification:  Name: Karla Lambert  Age: 85 y.o.  Sex: female  :  1932  MRN: [id number]         Primary Care Physician: Moiz Longo MD      Subjective  No new c/o.     Objective:  General Appearance:  Comfortable, in no acute distress and not in pain (Thin, frail).    Vital signs: (most recent): Blood pressure 145/74, pulse 79, temperature 98.3 °F (36.8 °C), temperature source Oral, resp. rate 16, height 149.9 cm (59\"), weight 40 kg (88 lb 1.6 oz), SpO2 93 %.    Lungs:  Normal effort and normal respiratory rate.  Breath sounds clear to auscultation.    Heart: Normal rate.  Regular rhythm.    Extremities: There is no dependent edema.    Neurological: Patient is alert.    Skin:  Warm and dry.                Vital signs in last 24 hours:  Temp:  [96.6 °F (35.9 °C)-98.3 °F (36.8 °C)] 98.3 °F (36.8 °C)  Heart Rate:  [] 79  Resp:  [16] 16  BP: (145-168)/(74-82) 145/74    Intake/Output:    Intake/Output Summary (Last 24 hours) at 2018 1018  Last data filed at 2018 0936  Gross per 24 hour   Intake 1046.6 ml   Output 1730 ml   Net -683.4 ml         Results from last 7 days   Lab Units  18   0534  18   0753  18   1211   WBC 10*3/mm3  8.24  8.65  12.51*   HEMOGLOBIN g/dL  11.1*  10.8*  12.8   PLATELETS 10*3/mm3  190  186  220     Results from last 7 days   Lab Units  18   0533  18   0752  18   1211   SODIUM mmol/L  138  139  139   POTASSIUM mmol/L  3.0*  3.5  4.0   CHLORIDE mmol/L  102  106  100   CO2 mmol/L  21.5*  20.6*  22.9   BUN mg/dL  10  17  25*   CREATININE mg/dL  0.54*  0.75  0.93   GLUCOSE mg/dL  94  108*  125*   Estimated Creatinine Clearance: 32.5 mL/min (A) (by C-G formula based on SCr of 0.54 mg/dL (L)).  Results from last 7 days   Lab Units  18   0533  18   0752  18   1211   CALCIUM mg/dL  8.3*  8.3*  9.4   ALBUMIN g/dL   --   2.90*   --      Results from last 7 " days   Lab Units  11/19/18   0752   ALBUMIN g/dL  2.90*   BILIRUBIN mg/dL  0.5   ALK PHOS U/L  62   AST (SGOT) U/L  32   ALT (SGPT) U/L  19       Assessment:    Traumatic rhabdomyolysis    Fall    Closed nondisplaced fracture of acromial end of right clavicle    Leukocytosis - stress.     Chronic obstructive pulmonary disease    Malignant neoplasm of bronchus of upper lobe    S/P aortic valve replacement with bioprosthetic valve    Dementia    HTN (hypertension)     Hematuria - Bacteruria     Hypokalemia:  Replace orally.           Plan:  Please see above.  D/C planning.  Discussed w CCP.    Carlin Reynolds MD  11/20/2018  10:18 AM

## 2018-11-20 NOTE — PROGRESS NOTES
Continued Stay Note  [facility]     Patient Name: Karla Lambert  MRN: [id number]  Today's Date: 11/20/2018    Admit Date: 11/18/2018    Discharge Plan     Row Name 11/20/18 1212       Plan    Plan  [facility]    Plan Comments  Per [place] with [facility], Jair 6 click is approved and patient has placement today; TASHI Plummer will provide transportation; will fax d/c summary when available and forward packet to nurse. KIRT Macdonald    Row Name 11/20/18 0959       Plan    Plan Comments  [facility] contacted [place]/[facility] to notify of A discharge plan; POA (Anila Plummer [phone]) at bedside agreed to short term rehab placement pending Jair precert. KIRT Macdonald    Row Name 11/20/18 0956       Plan    Plan  [facility] pending Jair precert.         Discharge Codes    No documentation.             Mirella Garrison RN

## 2018-11-21 NOTE — PROGRESS NOTES
Case Management Discharge Note    Final Note: Discharged to [facility] via private auto with POA.     Destination      No service has been selected for the patient.      Durable Medical Equipment      No service has been selected for the patient.      Dialysis/Infusion      No service has been selected for the patient.      Home Medical Care      No service has been selected for the patient.      Community Resources      No service has been selected for the patient.        Other: (private auto)    Final Discharge Disposition Code: 03 - skilled nursing facility (SNF)

## 2018-11-29 ENCOUNTER — TELEPHONE (OUTPATIENT)
Dept: INTERNAL MEDICINE | Age: 83
End: 2018-11-29

## 2018-11-29 NOTE — TELEPHONE ENCOUNTER
Patient fell on 11/18/18 and was hospitalized through 11/20/18 at [facility]. Patient needs a letter for Jair stating that she is homebound and meets medical necessity for home health. Patient also needs a referral to home health and she would like to use home instead. She is currently at [facility] and they anticipate her to be there another 3-4 weeks then being discharged home.

## 2018-11-30 NOTE — TELEPHONE ENCOUNTER
Write the letter-I will sign it.  Make whatever referral she needs for home health as per request.

## 2018-12-11 DIAGNOSIS — I10 ESSENTIAL HYPERTENSION: ICD-10-CM

## 2018-12-11 RX ORDER — ENALAPRIL MALEATE 5 MG/1
TABLET ORAL
Qty: 30 TABLET | Refills: 3 | Status: SHIPPED | OUTPATIENT
Start: 2018-12-11 | End: 2019-01-02 | Stop reason: SDUPTHER

## 2019-01-02 DIAGNOSIS — I10 ESSENTIAL HYPERTENSION: ICD-10-CM

## 2019-01-02 RX ORDER — ENALAPRIL MALEATE 5 MG/1
5 TABLET ORAL DAILY
Qty: 90 TABLET | Refills: 3 | Status: SHIPPED | OUTPATIENT
Start: 2019-01-02

## 2019-03-06 ENCOUNTER — APPOINTMENT (OUTPATIENT)
Dept: CT IMAGING | Facility: HOSPITAL | Age: 84
End: 2019-03-06
Attending: THORACIC SURGERY (CARDIOTHORACIC VASCULAR SURGERY)

## 2019-03-13 ENCOUNTER — LAB REQUISITION (OUTPATIENT)
Dept: LAB | Facility: HOSPITAL | Age: 84
End: 2019-03-13

## 2019-03-13 DIAGNOSIS — Z00.00 ROUTINE GENERAL MEDICAL EXAMINATION AT A HEALTH CARE FACILITY: ICD-10-CM

## 2019-03-13 LAB
ALBUMIN SERPL-MCNC: 2.5 G/DL (ref 3.5–5.2)
ALBUMIN/GLOB SERPL: 0.8 G/DL
ALP SERPL-CCNC: 73 U/L (ref 39–117)
ALT SERPL W P-5'-P-CCNC: 6 U/L (ref 1–33)
ANION GAP SERPL CALCULATED.3IONS-SCNC: 10.8 MMOL/L
AST SERPL-CCNC: 9 U/L (ref 1–32)
BASOPHILS # BLD AUTO: 0.03 10*3/MM3 (ref 0–0.2)
BASOPHILS NFR BLD AUTO: 0.2 % (ref 0–1.5)
BILIRUB SERPL-MCNC: 0.2 MG/DL (ref 0.1–1.2)
BUN BLD-MCNC: 25 MG/DL (ref 8–23)
BUN/CREAT SERPL: 43.1 (ref 7–25)
CALCIUM SPEC-SCNC: 9.7 MG/DL (ref 8.6–10.5)
CHLORIDE SERPL-SCNC: 105 MMOL/L (ref 98–107)
CO2 SERPL-SCNC: 27.2 MMOL/L (ref 22–29)
CREAT BLD-MCNC: 0.58 MG/DL (ref 0.57–1)
DEPRECATED RDW RBC AUTO: 53.2 FL (ref 37–54)
EOSINOPHIL # BLD AUTO: 0.06 10*3/MM3 (ref 0–0.4)
EOSINOPHIL NFR BLD AUTO: 0.4 % (ref 0.3–6.2)
ERYTHROCYTE [DISTWIDTH] IN BLOOD BY AUTOMATED COUNT: 16.4 % (ref 12.3–15.4)
GFR SERPL CREATININE-BSD FRML MDRD: 119 ML/MIN/1.73
GFR SERPL CREATININE-BSD FRML MDRD: 99 ML/MIN/1.73
GLOBULIN UR ELPH-MCNC: 3.1 GM/DL
GLUCOSE BLD-MCNC: 112 MG/DL (ref 65–99)
HCT VFR BLD AUTO: 27.6 % (ref 34–46.6)
HGB BLD-MCNC: 8.4 G/DL (ref 12–15.9)
IMM GRANULOCYTES # BLD AUTO: 0.13 10*3/MM3 (ref 0–0.05)
IMM GRANULOCYTES NFR BLD AUTO: 0.8 % (ref 0–0.5)
LYMPHOCYTES # BLD AUTO: 0.67 10*3/MM3 (ref 0.7–3.1)
LYMPHOCYTES NFR BLD AUTO: 4.3 % (ref 19.6–45.3)
MCH RBC QN AUTO: 26.8 PG (ref 26.6–33)
MCHC RBC AUTO-ENTMCNC: 30.4 G/DL (ref 31.5–35.7)
MCV RBC AUTO: 87.9 FL (ref 79–97)
MONOCYTES # BLD AUTO: 0.93 10*3/MM3 (ref 0.1–0.9)
MONOCYTES NFR BLD AUTO: 5.9 % (ref 5–12)
NEUTROPHILS # BLD AUTO: 13.94 10*3/MM3 (ref 1.4–7)
NEUTROPHILS NFR BLD AUTO: 88.4 % (ref 42.7–76)
NRBC BLD AUTO-RTO: 0 /100 WBC (ref 0–0)
PLATELET # BLD AUTO: 359 10*3/MM3 (ref 140–450)
PMV BLD AUTO: 10.2 FL (ref 6–12)
POTASSIUM BLD-SCNC: 4 MMOL/L (ref 3.5–5.2)
PROT SERPL-MCNC: 5.6 G/DL (ref 6–8.5)
RBC # BLD AUTO: 3.14 10*6/MM3 (ref 3.77–5.28)
SODIUM BLD-SCNC: 143 MMOL/L (ref 136–145)
WBC NRBC COR # BLD: 15.76 10*3/MM3 (ref 3.4–10.8)

## 2019-03-13 PROCEDURE — 80053 COMPREHEN METABOLIC PANEL: CPT

## 2019-03-13 PROCEDURE — 85025 COMPLETE CBC W/AUTO DIFF WBC: CPT
